# Patient Record
Sex: MALE | Race: WHITE | Employment: FULL TIME | URBAN - NONMETROPOLITAN AREA
[De-identification: names, ages, dates, MRNs, and addresses within clinical notes are randomized per-mention and may not be internally consistent; named-entity substitution may affect disease eponyms.]

---

## 2017-06-20 ENCOUNTER — TELEPHONE (OUTPATIENT)
Dept: FAMILY MEDICINE CLINIC | Age: 32
End: 2017-06-20

## 2017-09-08 ENCOUNTER — HOSPITAL ENCOUNTER (EMERGENCY)
Age: 32
Discharge: HOME | End: 2017-09-08
Attending: FAMILY MEDICINE
Payer: COMMERCIAL

## 2017-09-08 VITALS
SYSTOLIC BLOOD PRESSURE: 114 MMHG | RESPIRATION RATE: 14 BRPM | WEIGHT: 185 LBS | BODY MASS INDEX: 25.06 KG/M2 | HEART RATE: 87 BPM | DIASTOLIC BLOOD PRESSURE: 77 MMHG | TEMPERATURE: 97.6 F | OXYGEN SATURATION: 98 % | HEIGHT: 72 IN

## 2017-09-08 DIAGNOSIS — K29.00 ACUTE GASTRITIS WITHOUT HEMORRHAGE, UNSPECIFIED GASTRITIS TYPE: ICD-10-CM

## 2017-09-08 DIAGNOSIS — R10.32 LLQ ABDOMINAL PAIN: Primary | ICD-10-CM

## 2017-09-08 DIAGNOSIS — K62.5 RECTAL BLEEDING: ICD-10-CM

## 2017-09-08 LAB
ALBUMIN SERPL-MCNC: 4.4 G/DL (ref 3.5–5.1)
ALP BLD-CCNC: 45 U/L (ref 38–126)
ALT SERPL-CCNC: 15 U/L (ref 11–66)
ANION GAP SERPL CALCULATED.3IONS-SCNC: 12 MEQ/L (ref 8–16)
APTT: 31.8 SECONDS (ref 22–38)
AST SERPL-CCNC: 16 U/L (ref 5–40)
BASOPHILS # BLD: 0.4 %
BASOPHILS ABSOLUTE: 0 THOU/MM3 (ref 0–0.1)
BILIRUB SERPL-MCNC: 0.3 MG/DL (ref 0.3–1.2)
BILIRUBIN DIRECT: < 0.2 MG/DL (ref 0–0.3)
BUN BLDV-MCNC: 12 MG/DL (ref 7–22)
CALCIUM SERPL-MCNC: 9.2 MG/DL (ref 8.5–10.5)
CHLORIDE BLD-SCNC: 105 MEQ/L (ref 98–111)
CO2: 25 MEQ/L (ref 23–33)
CREAT SERPL-MCNC: 0.8 MG/DL (ref 0.4–1.2)
EOSINOPHIL # BLD: 6.4 %
EOSINOPHILS ABSOLUTE: 0.3 THOU/MM3 (ref 0–0.4)
GFR SERPL CREATININE-BSD FRML MDRD: > 90 ML/MIN/1.73M2
GLUCOSE BLD-MCNC: 85 MG/DL (ref 70–108)
HCT VFR BLD CALC: 42 % (ref 42–52)
HEMOCCULT STL QL: NEGATIVE
HEMOGLOBIN: 14.4 GM/DL (ref 14–18)
LIPASE: 26.5 U/L (ref 5.6–51.3)
LYMPHOCYTES # BLD: 34 %
LYMPHOCYTES ABSOLUTE: 1.7 THOU/MM3 (ref 1–4.8)
MCH RBC QN AUTO: 32.9 PG (ref 27–31)
MCHC RBC AUTO-ENTMCNC: 34.3 GM/DL (ref 33–37)
MCV RBC AUTO: 96 FL (ref 80–94)
MONOCYTES # BLD: 7.8 %
MONOCYTES ABSOLUTE: 0.4 THOU/MM3 (ref 0.4–1.3)
NUCLEATED RED BLOOD CELLS: 0 /100 WBC
OSMOLALITY CALCULATION: 282.1 MOSMOL/KG (ref 275–300)
PDW BLD-RTO: 13.9 % (ref 11.5–14.5)
PLATELET # BLD: 185 THOU/MM3 (ref 130–400)
PMV BLD AUTO: 8.3 MCM (ref 7.4–10.4)
POTASSIUM SERPL-SCNC: 4.4 MEQ/L (ref 3.5–5.2)
PRO-BNP: 25.9 PG/ML (ref 0–450)
RBC # BLD: 4.38 MILL/MM3 (ref 4.7–6.1)
RBC # BLD: NORMAL 10*6/UL
SEG NEUTROPHILS: 51.4 %
SEGMENTED NEUTROPHILS ABSOLUTE COUNT: 2.6 THOU/MM3 (ref 1.8–7.7)
SODIUM BLD-SCNC: 142 MEQ/L (ref 135–145)
TOTAL PROTEIN: 7 G/DL (ref 6.1–8)
WBC # BLD: 5.1 THOU/MM3 (ref 4.8–10.8)

## 2017-09-08 PROCEDURE — 82248 BILIRUBIN DIRECT: CPT

## 2017-09-08 PROCEDURE — 36415 COLL VENOUS BLD VENIPUNCTURE: CPT

## 2017-09-08 PROCEDURE — 2580000003 HC RX 258: Performed by: FAMILY MEDICINE

## 2017-09-08 PROCEDURE — 99214 OFFICE O/P EST MOD 30 MIN: CPT | Performed by: NURSE PRACTITIONER

## 2017-09-08 PROCEDURE — 85025 COMPLETE CBC W/AUTO DIFF WBC: CPT

## 2017-09-08 PROCEDURE — 99215 OFFICE O/P EST HI 40 MIN: CPT

## 2017-09-08 PROCEDURE — 83880 ASSAY OF NATRIURETIC PEPTIDE: CPT

## 2017-09-08 PROCEDURE — 80053 COMPREHEN METABOLIC PANEL: CPT

## 2017-09-08 PROCEDURE — 99285 EMERGENCY DEPT VISIT HI MDM: CPT

## 2017-09-08 PROCEDURE — 85730 THROMBOPLASTIN TIME PARTIAL: CPT

## 2017-09-08 PROCEDURE — 83690 ASSAY OF LIPASE: CPT

## 2017-09-08 PROCEDURE — 82272 OCCULT BLD FECES 1-3 TESTS: CPT

## 2017-09-08 RX ORDER — 0.9 % SODIUM CHLORIDE 0.9 %
1000 INTRAVENOUS SOLUTION INTRAVENOUS ONCE
Status: COMPLETED | OUTPATIENT
Start: 2017-09-08 | End: 2017-09-08

## 2017-09-08 RX ORDER — RANITIDINE 150 MG/1
150 TABLET ORAL 2 TIMES DAILY
Qty: 28 TABLET | Refills: 0 | Status: SHIPPED | OUTPATIENT
Start: 2017-09-08 | End: 2018-01-11 | Stop reason: ALTCHOICE

## 2017-09-08 RX ADMIN — SODIUM CHLORIDE 1000 ML: 9 INJECTION, SOLUTION INTRAVENOUS at 10:24

## 2017-09-08 ASSESSMENT — ENCOUNTER SYMPTOMS
NAUSEA: 1
NAUSEA: 0
ABDOMINAL DISTENTION: 0
VOMITING: 0
CHEST TIGHTNESS: 0
CONSTIPATION: 0
TROUBLE SWALLOWING: 0
SORE THROAT: 0
RECTAL PAIN: 0
COUGH: 0
BACK PAIN: 0
SHORTNESS OF BREATH: 0
COLOR CHANGE: 0
ANAL BLEEDING: 0
RHINORRHEA: 0
BLOOD IN STOOL: 1
DIARRHEA: 1
DIARRHEA: 0
WHEEZING: 0
ABDOMINAL PAIN: 1

## 2017-09-08 ASSESSMENT — PAIN DESCRIPTION - FREQUENCY
FREQUENCY: INTERMITTENT
FREQUENCY: INTERMITTENT

## 2017-09-08 ASSESSMENT — PAIN DESCRIPTION - ORIENTATION
ORIENTATION: LEFT;LOWER
ORIENTATION: LEFT
ORIENTATION: LEFT;LOWER

## 2017-09-08 ASSESSMENT — PAIN DESCRIPTION - DESCRIPTORS
DESCRIPTORS: ACHING
DESCRIPTORS: SHARP
DESCRIPTORS: ACHING

## 2017-09-08 ASSESSMENT — PAIN SCALES - GENERAL
PAINLEVEL_OUTOF10: 1
PAINLEVEL_OUTOF10: 4
PAINLEVEL_OUTOF10: 1

## 2017-09-08 ASSESSMENT — PAIN DESCRIPTION - LOCATION
LOCATION: ABDOMEN

## 2017-09-08 ASSESSMENT — PAIN DESCRIPTION - ONSET: ONSET: AWAKENED FROM SLEEP

## 2017-09-08 ASSESSMENT — PAIN DESCRIPTION - PAIN TYPE
TYPE: ACUTE PAIN

## 2017-12-05 ENCOUNTER — TELEPHONE (OUTPATIENT)
Dept: FAMILY MEDICINE CLINIC | Age: 32
End: 2017-12-05

## 2017-12-07 ENCOUNTER — OFFICE VISIT (OUTPATIENT)
Dept: FAMILY MEDICINE CLINIC | Age: 32
End: 2017-12-07
Payer: COMMERCIAL

## 2017-12-07 VITALS
TEMPERATURE: 98 F | WEIGHT: 188.4 LBS | RESPIRATION RATE: 12 BRPM | BODY MASS INDEX: 26.38 KG/M2 | DIASTOLIC BLOOD PRESSURE: 98 MMHG | HEART RATE: 72 BPM | SYSTOLIC BLOOD PRESSURE: 144 MMHG | HEIGHT: 71 IN

## 2017-12-07 DIAGNOSIS — F32.2 MAJOR DEPRESSIVE DISORDER, SINGLE EPISODE, SEVERE WITH ANXIOUS DISTRESS (HCC): ICD-10-CM

## 2017-12-07 DIAGNOSIS — Z13.220 SCREENING FOR HYPERLIPIDEMIA: Primary | ICD-10-CM

## 2017-12-07 DIAGNOSIS — R10.32 LEFT GROIN PAIN: ICD-10-CM

## 2017-12-07 PROCEDURE — 99213 OFFICE O/P EST LOW 20 MIN: CPT | Performed by: NURSE PRACTITIONER

## 2017-12-07 RX ORDER — HYDROXYZINE HYDROCHLORIDE 25 MG/1
TABLET, FILM COATED ORAL
Qty: 60 TABLET | Refills: 1 | Status: SHIPPED | OUTPATIENT
Start: 2017-12-07 | End: 2018-01-11 | Stop reason: SDUPTHER

## 2017-12-07 RX ORDER — CITALOPRAM 20 MG/1
20 TABLET ORAL DAILY
Qty: 30 TABLET | Refills: 3 | Status: SHIPPED | OUTPATIENT
Start: 2017-12-07 | End: 2018-01-11 | Stop reason: SDUPTHER

## 2017-12-07 RX ORDER — TRAZODONE HYDROCHLORIDE 50 MG/1
TABLET ORAL
Qty: 60 TABLET | Refills: 0 | Status: SHIPPED | OUTPATIENT
Start: 2017-12-07 | End: 2018-03-09 | Stop reason: SDUPTHER

## 2017-12-07 ASSESSMENT — PATIENT HEALTH QUESTIONNAIRE - PHQ9
2. FEELING DOWN, DEPRESSED OR HOPELESS: 2
7. TROUBLE CONCENTRATING ON THINGS, SUCH AS READING THE NEWSPAPER OR WATCHING TELEVISION: 2
3. TROUBLE FALLING OR STAYING ASLEEP: 3
5. POOR APPETITE OR OVEREATING: 2
1. LITTLE INTEREST OR PLEASURE IN DOING THINGS: 2
4. FEELING TIRED OR HAVING LITTLE ENERGY: 2
SUM OF ALL RESPONSES TO PHQ9 QUESTIONS 1 & 2: 4
10. IF YOU CHECKED OFF ANY PROBLEMS, HOW DIFFICULT HAVE THESE PROBLEMS MADE IT FOR YOU TO DO YOUR WORK, TAKE CARE OF THINGS AT HOME, OR GET ALONG WITH OTHER PEOPLE: 3
SUM OF ALL RESPONSES TO PHQ QUESTIONS 1-9: 16
9. THOUGHTS THAT YOU WOULD BE BETTER OFF DEAD, OR OF HURTING YOURSELF: 0
8. MOVING OR SPEAKING SO SLOWLY THAT OTHER PEOPLE COULD HAVE NOTICED. OR THE OPPOSITE, BEING SO FIGETY OR RESTLESS THAT YOU HAVE BEEN MOVING AROUND A LOT MORE THAN USUAL: 1
6. FEELING BAD ABOUT YOURSELF - OR THAT YOU ARE A FAILURE OR HAVE LET YOURSELF OR YOUR FAMILY DOWN: 2

## 2017-12-07 NOTE — PROGRESS NOTES
Chief Complaint   Patient presents with   Meg Jolly Doctor     previous pcp Dr Sadia Luu Depression     currently seeing Alber Ochoa in [place], [place]       History obtained from chart review and the patient. SUBJECTIVE:  Tammy Sanchez is a 34 y.o. male that presents today for establishing care with new physician, etc. New patient, 1st time visit to [facility] @ [address]. Depressed Mood    HPI:  Has been dealing with depression for about 3-4 years. Previously his depressive symptoms were manageable. He lost his dad 1 year ago and his depression has been terrible since then. Also having a lot of anxiety. Doesn't know triggers, can be public situations. But sometimes will wake up at night in panic attack. Will get a panic attack about every other day. Symptoms CP, lightheaded, SOB diaphoretic  Depressed Mood? yes - daily  Anhedonia? yes - doesn't care about anything, poor motivation  Appetite changes? yes - some days he can't eat, some days he feels like he could eat a horse  Sleep disturbances? yes - difficulty falling asleep and staying asleep  Feelings of guilt? yes - feels inadequate, doesn't know why he feels this way  Decreased energy? yes - poor, no drive to do anything  Impaired concentration? yes - feels like he just goes from one thing to another, can't ever get anything done  Substance abuse? yes - marijuana in the past, but has been clean for about > 6 months, used to drink a lot of ETOH, but is currently doing none. Clean since Sept 16 2017. Is also going to AA. Suicidal/Homicidal Ideation? no      Compliant with meds: n/a  Med side effects: n/a  Sees therapist?:  yes - Andrey Speaks  Family History of Mental Illness?   yes - dad    Review of Systems - Psychological ROS: positive for - anxiety, decreased libido, hostility, irritability, memory difficulties, mood swings and sleep disturbances, negative for - disorientation, hallucinations, physical abuse or sexual abuse    Also feels a popping sensation in his left groin when he goes to have a BM. Does not feel a bulge in his left groin, and it occurs pretty much every time he strains for BM. 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 Age/Gender Health Maintenance    Lipid - n/a  DM Screen - n/a  Colon Cancer Screening - n/a  Lung Cancer Screening (Age 54 to [de-identified] with 30 pack year hx, current smoker or quit within past 15 years) - n/a    Tetanus - needs  Influenza Vaccine - needs  Pneumonia Vaccine - needs  Zostavax - n/a     PSA testing discussion - n/a  AAA Screening - n/a    Falls screening - n/a    Current Outpatient Prescriptions   Medication Sig Dispense Refill    citalopram (CELEXA) 20 MG tablet Take 1 tablet by mouth daily 30 tablet 3    traZODone (DESYREL) 50 MG tablet Take 1-2 tablets by mouth nightly prn for sleep 60 tablet 0    hydrOXYzine (ATARAX) 25 MG tablet Take 1-2 tablets by mouth every 6-8 hours prn for anxiety. 60 tablet 1    ranitidine (ZANTAC) 150 MG tablet Take 1 tablet by mouth 2 times daily for 14 days 28 tablet 0     No current facility-administered medications for this visit. Orders Placed This Encounter   Medications    citalopram (CELEXA) 20 MG tablet     Sig: Take 1 tablet by mouth daily     Dispense:  30 tablet     Refill:  3    traZODone (DESYREL) 50 MG tablet     Sig: Take 1-2 tablets by mouth nightly prn for sleep     Dispense:  60 tablet     Refill:  0    hydrOXYzine (ATARAX) 25 MG tablet     Sig: Take 1-2 tablets by mouth every 6-8 hours prn for anxiety. Dispense:  60 tablet     Refill:  1         All medications reviewed and reconciled, including OTC and herbal medications. Updated list given to patient.        Patient Active Problem List   Diagnosis    Alcohol abuse    Drug abuse    Major depressive disorder, single episode, severe with anxious distress ([address]       Past Medical History:   Diagnosis Date    Depression     Major depressive disorder, single episode, severe with Good insight and appropriate interaction. Cognition and memory appear to be intact. Skin: warm and dry, no rash or erythema  Lymph:  No cervical, auricular or supraclavicular lymph nodes palpated        ASSESSMENT & PLAN  1. Major depressive disorder, single episode, severe with anxious distress ([facility]  - benefits, risks and SE discussed  - no benzo's due to Hx of ETOH and substance abuse  - Atarax for panic attacks, Trazodone for sleep  - recommend continue counseling  - citalopram (CELEXA) 20 MG tablet; Take 1 tablet by mouth daily  Dispense: 30 tablet; Refill: 3  - traZODone (DESYREL) 50 MG tablet; Take 1-2 tablets by mouth nightly prn for sleep  Dispense: 60 tablet; Refill: 0  - hydrOXYzine (ATARAX) 25 MG tablet; Take 1-2 tablets by mouth every 6-8 hours prn for anxiety. Dispense: 60 tablet; Refill: 1  - Comprehensive Metabolic Panel; Future  - CBC With Auto Differential; Future  - TSH With Reflex Ft4; Future  - Vitamin D 25 Hydroxy; Future    2. Screening for hyperlipidemia  - Lipid Panel; Future    3. Groin strain, left, initial encounter  - monitor for now, NSAIDS  - if no improvement in several weeks with next appt, will consider doing US left groin to rule out occult hernia        DISPOSITION    Return in about 4 weeks (around 1/4/2018) for anxiety and depression. Adrian Jack released without restrictions. PATIENT COUNSELING    Counseling was provided today regarding the following topics: Healthy eating habits, Regular exercise, substance abuse and healthy sleep habits. Adrian Jack received counseling on the following healthy behaviors: nutrition, exercise and medication adherence    Patient given educational materials on: See Attached    I have instructed Adrian Jack to complete a self tracking handout on none and instructed them to bring it with them to his next appointment. Barriers to learning and self management: none    Discussed use, benefit, and side effects of prescribed medications.   Barriers to medication compliance addressed. All patient questions answered. Pt voiced understanding.        Electronically signed by Darryle Rondo, CNP on 12/7/2017 at 10:13 AM

## 2018-01-11 ENCOUNTER — OFFICE VISIT (OUTPATIENT)
Dept: FAMILY MEDICINE CLINIC | Age: 33
End: 2018-01-11
Payer: COMMERCIAL

## 2018-01-11 VITALS
HEIGHT: 71 IN | TEMPERATURE: 97.9 F | WEIGHT: 184.8 LBS | HEART RATE: 71 BPM | BODY MASS INDEX: 25.87 KG/M2 | DIASTOLIC BLOOD PRESSURE: 108 MMHG | RESPIRATION RATE: 14 BRPM | SYSTOLIC BLOOD PRESSURE: 146 MMHG

## 2018-01-11 DIAGNOSIS — R03.0 ELEVATED BP WITHOUT DIAGNOSIS OF HYPERTENSION: ICD-10-CM

## 2018-01-11 DIAGNOSIS — F41.0 PANIC ATTACKS: Primary | ICD-10-CM

## 2018-01-11 DIAGNOSIS — F32.2 MAJOR DEPRESSIVE DISORDER, SINGLE EPISODE, SEVERE WITH ANXIOUS DISTRESS (HCC): ICD-10-CM

## 2018-01-11 LAB
ABSOLUTE BASO #: 0 K/UL (ref 0–0.1)
ABSOLUTE EOS #: 0.2 K/UL (ref 0.1–0.4)
ABSOLUTE LYMPH #: 1.3 K/UL (ref 0.8–5.2)
ABSOLUTE MONO #: 0.5 K/UL (ref 0.1–0.9)
ABSOLUTE NEUT #: 3.3 K/UL (ref 1.3–9.1)
ALBUMIN SERPL-MCNC: 4.5 G/DL (ref 3.2–5.3)
ALK PHOSPHATASE: 55 IU/L (ref 35–121)
ALT SERPL-CCNC: 14 IU/L (ref 5–59)
ANION GAP SERPL CALCULATED.3IONS-SCNC: 15 MMOL/L
AST SERPL-CCNC: 19 IU/L (ref 10–42)
BASOPHILS RELATIVE PERCENT: 0.7 %
BILIRUB SERPL-MCNC: 1 MG/DL (ref 0.2–1.3)
BUN BLDV-MCNC: 9 MG/DL (ref 10–20)
CALCIUM SERPL-MCNC: 10 MG/DL (ref 8.7–10.8)
CHLORIDE BLD-SCNC: 103 MMOL/L (ref 95–111)
CHOLESTEROL/HDL RATIO: 2
CHOLESTEROL: 164 MG/DL
CO2: 27 MMOL/L (ref 21–32)
CREAT SERPL-MCNC: 0.9 MG/DL (ref 0.5–1.3)
EGFR AFRICAN AMERICAN: 118
EGFR IF NONAFRICAN AMERICAN: 97
EOSINOPHILS RELATIVE PERCENT: 3.6 %
GLUCOSE: 82 MG/DL (ref 70–100)
HCT VFR BLD CALC: 47 % (ref 41.4–51)
HDLC SERPL-MCNC: 83 MG/DL (ref 40–60)
HEMOGLOBIN: 16.4 G/DL (ref 13.8–17)
LDL CHOLESTEROL CALCULATED: 65 MG/DL
LDL/HDL RATIO: 0.8
LYMPHOCYTE %: 23.8 %
MCH RBC QN AUTO: 33.3 PG (ref 27–34)
MCHC RBC AUTO-ENTMCNC: 34.9 G/DL (ref 31–36)
MCV RBC AUTO: 95.5 FL (ref 80–100)
MONOCYTES # BLD: 9.6 %
NEUTROPHILS RELATIVE PERCENT: 62.1 %
PDW BLD-RTO: 13 % (ref 10.8–14.8)
PLATELETS: 217 K/UL (ref 150–450)
POTASSIUM SERPL-SCNC: 4.5 MMOL/L (ref 3.5–5.4)
RBC: 4.92 M/UL (ref 4–5.5)
SODIUM BLD-SCNC: 140 MMOL/L (ref 134–147)
TOTAL PROTEIN: 7.5 G/DL (ref 5.8–8)
TRIGL SERPL-MCNC: 80 MG/DL
TSH SERPL DL<=0.05 MIU/L-ACNC: 0.89 UIU/ML (ref 0.4–4.4)
VLDLC SERPL CALC-MCNC: 16 MG/DL
WBC: 5.3 K/UL (ref 3.7–10.8)

## 2018-01-11 PROCEDURE — 99213 OFFICE O/P EST LOW 20 MIN: CPT | Performed by: NURSE PRACTITIONER

## 2018-01-11 RX ORDER — HYDROXYZINE 50 MG/1
TABLET, FILM COATED ORAL
Qty: 60 TABLET | Refills: 1 | Status: SHIPPED | OUTPATIENT
Start: 2018-01-11 | End: 2018-08-09

## 2018-01-11 RX ORDER — CITALOPRAM 40 MG/1
40 TABLET ORAL DAILY
Qty: 30 TABLET | Refills: 5 | Status: SHIPPED | OUTPATIENT
Start: 2018-01-11 | End: 2018-03-09 | Stop reason: ALTCHOICE

## 2018-01-12 ENCOUNTER — TELEPHONE (OUTPATIENT)
Dept: FAMILY MEDICINE CLINIC | Age: 33
End: 2018-01-12

## 2018-01-12 LAB — VITAMIN D 25-HYDROXY: 28 NG/ML

## 2018-01-12 NOTE — TELEPHONE ENCOUNTER
----- Message from Dyana Busby CNP sent at 1/12/2018  8:31 AM EST -----  Let Danisha Mary Jos know his labs look great. Normal kidney, liver function, thyroid function and blood count. His cholesterol is good total cholesterol 164, triglycerides 80, HDL 83, LDL 65. Cholesterol is good. His vitamin D is a little low at 28, i'd recommend  He take an OTC vitamin D supplement daily (400 units of vitamin D3 is fine) or a multi vitamin should be fine. Most multi vitamins have vitamin D in them and it should be enough to get it up. Any questions let me know!

## 2018-03-09 ENCOUNTER — TELEPHONE (OUTPATIENT)
Dept: FAMILY MEDICINE CLINIC | Age: 33
End: 2018-03-09

## 2018-03-09 DIAGNOSIS — F32.2 MAJOR DEPRESSIVE DISORDER, SINGLE EPISODE, SEVERE WITH ANXIOUS DISTRESS (HCC): Primary | ICD-10-CM

## 2018-03-09 DIAGNOSIS — F32.2 MAJOR DEPRESSIVE DISORDER, SINGLE EPISODE, SEVERE WITH ANXIOUS DISTRESS (HCC): ICD-10-CM

## 2018-03-09 RX ORDER — TRAZODONE HYDROCHLORIDE 50 MG/1
TABLET ORAL
Qty: 60 TABLET | Refills: 0 | Status: SHIPPED | OUTPATIENT
Start: 2018-03-09 | End: 2018-08-09

## 2018-03-09 RX ORDER — SERTRALINE HYDROCHLORIDE 100 MG/1
100 TABLET, FILM COATED ORAL DAILY
Qty: 30 TABLET | Refills: 3 | Status: SHIPPED | OUTPATIENT
Start: 2018-03-09 | End: 2018-03-28 | Stop reason: SDUPTHER

## 2018-03-09 NOTE — TELEPHONE ENCOUNTER
Ok, stop the Celexa then, I will send in Rx for Zoloft 100 mg. He needs to follow up with me in the office.

## 2018-03-28 ENCOUNTER — OFFICE VISIT (OUTPATIENT)
Dept: FAMILY MEDICINE CLINIC | Age: 33
End: 2018-03-28
Payer: COMMERCIAL

## 2018-03-28 VITALS
TEMPERATURE: 97.8 F | BODY MASS INDEX: 25.17 KG/M2 | DIASTOLIC BLOOD PRESSURE: 84 MMHG | WEIGHT: 185.8 LBS | RESPIRATION RATE: 14 BRPM | SYSTOLIC BLOOD PRESSURE: 132 MMHG | HEIGHT: 72 IN | HEART RATE: 71 BPM

## 2018-03-28 DIAGNOSIS — F32.2 MAJOR DEPRESSIVE DISORDER, SINGLE EPISODE, SEVERE WITH ANXIOUS DISTRESS (HCC): ICD-10-CM

## 2018-03-28 DIAGNOSIS — Z13.31 POSITIVE DEPRESSION SCREENING: Primary | ICD-10-CM

## 2018-03-28 PROCEDURE — 99213 OFFICE O/P EST LOW 20 MIN: CPT | Performed by: NURSE PRACTITIONER

## 2018-03-28 PROCEDURE — G8431 POS CLIN DEPRES SCRN F/U DOC: HCPCS | Performed by: NURSE PRACTITIONER

## 2018-03-28 RX ORDER — SERTRALINE HYDROCHLORIDE 100 MG/1
150 TABLET, FILM COATED ORAL DAILY
Qty: 135 TABLET | Refills: 3 | Status: SHIPPED | OUTPATIENT
Start: 2018-03-28 | End: 2018-08-09

## 2018-03-28 NOTE — PROGRESS NOTES
weight  Eyes:  Eye discharge, Eye pain, Eye redness, Visual disturbances   HENT:  Ear pain, Tinnitus, Nosebleeds, Trouble swallowing, Hearing loss, Sore throat  Cardiovascular:  Chest Pain, Palpitations, Orthopnea, Paroxysmal Nocturnal Dyspnea  Respiratory:  Cough, Wheezing, Shortness of breath, Chest tightness, Apnea  Gastrointestinal:  Nausea, Vomiting, Diarrhea, Constipation, Heartburn, Blood in stool  Genitourinary:  Difficulty or painful urination, Flank pain, Change in frequency, Urgency  Skin:  Color change, Rash, Itching, Wound  Psychiatric:  Hallucinations, Anxiety, Depression, Suicidal ideation  Hematological:  Enlarged glands, Easy bleeding, Easily bruising  Musculoskeletal:  Joint pain, Back pain, Gait problems, Joint swelling, Myalgias  Neurological:  Dizziness, Headaches, Presyncope, Numbness, Seizures, Tremors  Allergy:  Environmental allergies, Food allergies  Endocrine:  Heat Intolerance, Cold Intolerance, Polydipsia, Polyphagia, Polyuria      PHYSICAL EXAM:  Vitals:    03/28/18 1130   BP: 132/84   Pulse: 71   Resp: 14   Temp: 97.8 °F (36.6 °C)   TempSrc: Oral   Weight: 185 lb 12.8 oz (84.3 kg)   Height: 6' (1.829 m)     Body mass index is 25.2 kg/m². VS Reviewed  General Appearance: A&O x 3, No acute distress,well developed and well- nourished  Head: normocephalic and atraumatic  Eyes: pupils equal, round, and reactive to light, extraocular eye movements intact, conjunctivae and eye lids without erythema  Neck: supple and non-tender without mass, no thyromegaly or thyroid nodules, no cervical lymphadenopathy  Pulmonary/Chest: clear to auscultation bilaterally- no wheezes, rales or rhonchi, normal air movement, no respiratory distress or retractions  Cardiovascular: S1 and S2 auscultated w/ RRR. No murmurs, rubs, clicks, or gallops, distal pulses intact. Abdomen: soft, non-tender, non-distended, bowl sounds physiologic,  no rebound or guarding, no masses or hernias noted.  Liver and spleen without enlargement  Extremities: no cyanosis, clubbing or edema of the lower extremities  Musculoskeletal: No joint swelling or gross deformity   Neuro:  Alert, 5/5 strength globally and symmetrically  Psych: Affect flat. Mood normal. Thought process is normal without evidence of psychosis. Good insight and appropriate interaction. Cognition and memory appear to be intact. Skin: warm and dry, no rash or erythema  Lymph:  No cervical, auricular or supraclavicular lymph nodes palpated      ASSESSMENT & PLAN  Deborah Proctor was seen today for follow-up. Diagnoses and all orders for this visit:    Positive depression screening  -     Positive Screen for Clinical Depression with a Documented Follow-up Plan   -     sertraline (ZOLOFT) 100 MG tablet; Take 1.5 tablets by mouth daily    Major depressive disorder, single episode, severe with anxious distress (HCC)  -     sertraline (ZOLOFT) 100 MG tablet; Take 1.5 tablets by mouth daily    increase Zoloft more for anxiety  Cannot do Benzos for panic attacks due to substance abuse Hx  Continue Atarax prn  May consider adding Buspar  Depression in partial remission    DISPOSITION    Return in about 2 months (around 5/28/2018) for anxiety and depression. Deborah Proctor released without restrictions. PATIENT COUNSELING    Counseling was provided today regarding the following topics: Healthy eating habits, Regular exercise, substance abuse and healthy sleep habits. Deborah Proctor received counseling on the following healthy behaviors: nutrition, exercise and medication adherence    Patient given educational materials on: See Attached    I have instructed Deborah Proctor to complete a self tracking handout on none and instructed them to bring it with them to his next appointment. Barriers to learning and self management: none    Discussed use, benefit, and side effects of prescribed medications. Barriers to medication compliance addressed. All patient questions answered.   Pt voiced understanding. Electronically signed by Yamini Herzog CNP on 3/28/2018 at 11:44 AM    On the basis of positive PHQ-9 screening ( ), the following plan was implemented: medication prescribed: Zoloft- 150 mg- patient will call for any significant medication side effects or worsening symptoms of depression. Patient will follow-up in 2 month(s) with PCP.

## 2018-04-04 ENCOUNTER — TELEPHONE (OUTPATIENT)
Dept: FAMILY MEDICINE CLINIC | Age: 33
End: 2018-04-04

## 2018-08-09 ENCOUNTER — HOSPITAL ENCOUNTER (EMERGENCY)
Age: 33
Discharge: HOME | End: 2018-08-09
Attending: EMERGENCY MEDICINE
Payer: COMMERCIAL

## 2018-08-09 VITALS
SYSTOLIC BLOOD PRESSURE: 101 MMHG | BODY MASS INDEX: 25.73 KG/M2 | HEART RATE: 70 BPM | DIASTOLIC BLOOD PRESSURE: 66 MMHG | WEIGHT: 190 LBS | RESPIRATION RATE: 20 BRPM | OXYGEN SATURATION: 97 % | TEMPERATURE: 97.6 F | HEIGHT: 72 IN

## 2018-08-09 DIAGNOSIS — R30.0 DYSURIA: Primary | ICD-10-CM

## 2018-08-09 LAB
ANION GAP SERPL CALCULATED.3IONS-SCNC: 13 MEQ/L (ref 8–16)
BASOPHILS # BLD: 0.6 %
BASOPHILS ABSOLUTE: 0 THOU/MM3 (ref 0–0.1)
BILIRUBIN URINE: NEGATIVE
BLOOD, URINE: NEGATIVE
BUN BLDV-MCNC: 8 MG/DL (ref 7–22)
CALCIUM SERPL-MCNC: 9.3 MG/DL (ref 8.5–10.5)
CHARACTER, URINE: CLEAR
CHLAMYDIA TRACHOMATIS BY RT-PCR: NOT DETECTED
CHLORIDE BLD-SCNC: 106 MEQ/L (ref 98–111)
CO2: 27 MEQ/L (ref 23–33)
COLOR: YELLOW
CREAT SERPL-MCNC: 1 MG/DL (ref 0.4–1.2)
CT/NG SOURCE: NORMAL
EOSINOPHIL # BLD: 2.7 %
EOSINOPHILS ABSOLUTE: 0.1 THOU/MM3 (ref 0–0.4)
ERYTHROCYTE [DISTWIDTH] IN BLOOD BY AUTOMATED COUNT: 12.3 % (ref 11.5–14.5)
ERYTHROCYTE [DISTWIDTH] IN BLOOD BY AUTOMATED COUNT: 42.7 FL (ref 35–45)
GFR SERPL CREATININE-BSD FRML MDRD: 86 ML/MIN/1.73M2
GLUCOSE BLD-MCNC: 93 MG/DL (ref 70–108)
GLUCOSE URINE: NEGATIVE MG/DL
HCT VFR BLD CALC: 42.8 % (ref 42–52)
HEMOGLOBIN: 14.8 GM/DL (ref 14–18)
IMMATURE GRANS (ABS): 0.01 THOU/MM3 (ref 0–0.07)
IMMATURE GRANULOCYTES: 0.2 %
KETONES, URINE: NEGATIVE
LEUKOCYTE ESTERASE, URINE: NEGATIVE
LYMPHOCYTES # BLD: 38.6 %
LYMPHOCYTES ABSOLUTE: 2 THOU/MM3 (ref 1–4.8)
MCH RBC QN AUTO: 32.7 PG (ref 26–33)
MCHC RBC AUTO-ENTMCNC: 34.6 GM/DL (ref 32.2–35.5)
MCV RBC AUTO: 94.7 FL (ref 80–94)
MONOCYTES # BLD: 10 %
MONOCYTES ABSOLUTE: 0.5 THOU/MM3 (ref 0.4–1.3)
NEISSERIA GONORRHOEAE BY RT-PCR: NOT DETECTED
NITRITE, URINE: NEGATIVE
NUCLEATED RED BLOOD CELLS: 0 /100 WBC
OSMOLALITY CALCULATION: 288.6 MOSMOL/KG (ref 275–300)
PH UA: 6
PLATELET # BLD: 195 THOU/MM3 (ref 130–400)
PMV BLD AUTO: 10.6 FL (ref 9.4–12.4)
POTASSIUM SERPL-SCNC: 4.3 MEQ/L (ref 3.5–5.2)
PROTEIN UA: NEGATIVE
RBC # BLD: 4.52 MILL/MM3 (ref 4.7–6.1)
SEG NEUTROPHILS: 47.9 %
SEGMENTED NEUTROPHILS ABSOLUTE COUNT: 2.5 THOU/MM3 (ref 1.8–7.7)
SODIUM BLD-SCNC: 146 MEQ/L (ref 135–145)
SPECIFIC GRAVITY, URINE: 1.02 (ref 1–1.03)
UROBILINOGEN, URINE: 0.2 EU/DL
WBC # BLD: 5.2 THOU/MM3 (ref 4.8–10.8)

## 2018-08-09 PROCEDURE — 87591 N.GONORRHOEAE DNA AMP PROB: CPT

## 2018-08-09 PROCEDURE — 81003 URINALYSIS AUTO W/O SCOPE: CPT

## 2018-08-09 PROCEDURE — 99283 EMERGENCY DEPT VISIT LOW MDM: CPT

## 2018-08-09 PROCEDURE — 87491 CHLMYD TRACH DNA AMP PROBE: CPT

## 2018-08-09 PROCEDURE — 80048 BASIC METABOLIC PNL TOTAL CA: CPT

## 2018-08-09 PROCEDURE — 36415 COLL VENOUS BLD VENIPUNCTURE: CPT

## 2018-08-09 PROCEDURE — 85025 COMPLETE CBC W/AUTO DIFF WBC: CPT

## 2018-08-09 ASSESSMENT — PAIN DESCRIPTION - PAIN TYPE
TYPE: ACUTE PAIN
TYPE: ACUTE PAIN

## 2018-08-09 ASSESSMENT — PAIN DESCRIPTION - FREQUENCY
FREQUENCY: CONTINUOUS
FREQUENCY: CONTINUOUS

## 2018-08-09 ASSESSMENT — PAIN DESCRIPTION - LOCATION
LOCATION: FLANK
LOCATION: FLANK

## 2018-08-09 ASSESSMENT — PAIN DESCRIPTION - DESCRIPTORS
DESCRIPTORS: ACHING
DESCRIPTORS: ACHING

## 2018-08-09 ASSESSMENT — PAIN SCALES - GENERAL
PAINLEVEL_OUTOF10: 4
PAINLEVEL_OUTOF10: 4

## 2018-08-09 ASSESSMENT — PAIN DESCRIPTION - ORIENTATION: ORIENTATION: RIGHT;LEFT

## 2018-08-09 NOTE — LETTER
[facility] EMERGENCY DEPT  [address]  [address] [facility] [address]  Phone: [phone]               August 9, 2018    Patient: Priyanka Mcdaniels   YOB: 1985   Date of Visit: 8/9/2018       To Whom It May Concern:    Eyad Nassar was seen and treated in our emergency department on 8/9/2018. He may return to work on 08/10/2018.       Sincerely,               Signature:__________________________________

## 2018-08-09 NOTE — ED PROVIDER NOTES
(36.4 °C). His blood pressure is 101/66 and his pulse is 70. His respiration is 20 and oxygen saturation is 97%. Constitutional: Well appearing and non-toxic   HENT:  Atraumatic appearing  Neck- ,supple   Respiratory:  No wheezing, rhonchi or rales  Cardiovascular: regular, no murmur  GI:  Non tender, no rigidity, rebound or guarding  No suprapubic tenderness  :  No costovertebral angle tenderness   Musculoskeletal:  2/4 distal pulses, no pitting edema  Integument: warm and dry    DIAGNOSTIC RESULTS               LABS:   Labs Reviewed   CBC WITH AUTO DIFFERENTIAL - Abnormal; Notable for the following:        Result Value    RBC 4.52 (*)     MCV 94.7 (*)     All other components within normal limits   BASIC METABOLIC PANEL - Abnormal; Notable for the following:     Sodium 146 (*)     All other components within normal limits   GLOMERULAR FILTRATION RATE, ESTIMATED - Abnormal; Notable for the following:     Est, Glom Filt Rate 86 (*)     All other components within normal limits   C. TRACHOMATIS / N. GONORRHOEAE, DNA   URINE RT REFLEX TO CULTURE   ANION GAP   OSMOLALITY   URINALYSIS WITH MICROSCOPIC       EMERGENCY DEPARTMENT COURSE:   Vitals:    Vitals:    08/09/18 0709 08/09/18 0857   BP: 134/81 101/66   Pulse: 82 70   Resp: 18 20   Temp: 97.6 °F (36.4 °C)    TempSrc: Oral    SpO2: 99% 97%   Weight: 190 lb (86.2 kg)    Height: 6' (1.829 m)      This patient complained of urinary symptoms of painful urination and hesitance and he was able to provide a urine specimen. He is negative for UTI. There is no blood in his urine. We also put out a test for the [address] and chlamydia but by history it sounds like he is not necessarily been any recent exposure. He has no evidence of diabetes and has good kidney function. This could potentially be a prostate issue. In any case we will refer him to urology on an outpatient basis.     CRITICAL CARE:   none    CONSULTS:  none    PROCEDURES:  None    FINAL IMPRESSION      1.

## 2019-04-16 NOTE — PATIENT INSTRUCTIONS
_________   BP__________                   Date__________    BP __________    Date _________   BP__________                   Date__________    BP __________    Date _________   BP__________                   Date__________    BP __________    Date _________   BP__________                   Date__________    BP __________    Date _________   BP__________                   Date__________    BP __________    Date _________   BP__________                   Date__________    BP __________    Date _________   BP__________                   Date__________    BP __________    Date _________   BP__________                   Date__________    BP __________    Date _________   BP__________                   Date__________    BP __________ home

## 2020-01-17 ENCOUNTER — INITIAL CONSULT (OUTPATIENT)
Dept: PULMONOLOGY | Age: 35
End: 2020-01-17
Payer: COMMERCIAL

## 2020-01-17 VITALS
WEIGHT: 186 LBS | HEART RATE: 68 BPM | HEIGHT: 72 IN | BODY MASS INDEX: 25.19 KG/M2 | DIASTOLIC BLOOD PRESSURE: 76 MMHG | OXYGEN SATURATION: 98 % | SYSTOLIC BLOOD PRESSURE: 124 MMHG

## 2020-01-17 PROCEDURE — 99204 OFFICE O/P NEW MOD 45 MIN: CPT | Performed by: INTERNAL MEDICINE

## 2020-01-17 RX ORDER — FLUTICASONE PROPIONATE 50 MCG
1 SPRAY, SUSPENSION (ML) NASAL DAILY
COMMUNITY
End: 2021-01-17

## 2020-01-17 RX ORDER — LORATADINE 10 MG/1
10 CAPSULE, LIQUID FILLED ORAL DAILY
COMMUNITY
End: 2022-02-02

## 2020-01-17 NOTE — PATIENT INSTRUCTIONS
Recommendations/Plan:  -He was advised to stop taking Claritin to improve his excessive daytime sleepiness.  -Schedule patient for nocturnal polysomnogram (Sleep study) followed by Multiple sleep latency test at [facility] sleep lab to further evaluate for Narcolepsy.  -I had a discussion with patient regarding avialable treatment options for his sleep disorder breathing including but not limited to CPAP titration in the sleep lab Vs.Dental appliance placement with referral to a local dentist Vs other available surgical options including Uvulopalatopharyngoplasty, maxillomandibular ostomy and tracheostomy as last option. At the end of discussion, he is not decided on his   treatment if he found to have obstructive sleep apnea at this time.  -We will see Molly Ivory back in 1week after the sleep study to go over the sleep study results and further management options.  -He was educated to practice good sleep hygiene practices. He  was provided with a good sleep hygiene hand out.   -Patient educated about shift work sleep disorder and mechanism. He   was advised to maintain fixed sleep schedule.   -Tara Jiménez was advised to make earlier appointment with my clinic if he develops any worsening of sleep symptoms. He verbalizes understanding.  -Tara Jiménez was advised to not to drive any motor vehicles or operate heavy equipment until his sleep symptoms are under good control. Molly Ivory verbalizes understanding.  - Molly Ivory was educated about my impression and plan. He verbalizes understanding.

## 2020-01-17 NOTE — PROGRESS NOTES
Chief Complaint:  Mendez Colvin is here today for a consult for excessive sleepiness. Work recommended patient come. No previous testing.   Mallampati airway Class:III  Neck Circumference:16 Inches    [place] sleepiness score 1/17/20:

## 2020-01-17 NOTE — PROGRESS NOTES
[facility]    Nisha Redd                                             Chief Complaint:  Marcell Vides is here today for a consult for excessive sleepiness. Work recommended patient come. No previous testing. Chief complaint: Nisha Redd is a 27 y. o.oldmale came for further evaluation regarding his ?sleep apnea  with referral from [facility]  by Dr. Chayo Lora from his work place [employer] [place]:    Sleep/Wake schedule:  Usual time to go to bed during the work/regular day of week: 1:00 AM.  Usual time to wake up during the work//regular day of week: 1:00 PM.    He is currently working in second shift. He goes to work at 2:30pm and completes his work at 10:30 Pm. He works 5days a week in above shift. On the weekends his sleep schedule is different from above: On weekends he goes to bed to 11:00 pm and wakes up at 9:00 to 10:00Am the next day. Over the weekends his sleep schedule:.  []phase advanced. He usually falls a sleep in less than: 10 minutes. He takes naps: Yes. Number of naps per week:  3 to 4 times. During each nap he spends a total of: 1 to 2hour  The naps were reported as refreshing: Yes. Sleep Hygiene:    Is the temperature and evironment in his bed room is acceptable to him: Yes. He watches Television in his bed room: No.  He read books, study, pay bills etc in the bed: No.  Frequency He wake up during night/sleep: 1 time  Majority of nocturnal awakenings are for urination: No  Difficulty in falling back to sleep after nocturnal awakenings: No  .  Do you drink coffee: No.  Do you drink caffeinated beverages i.e sodas: Yes. 3 to 4 can/s per week. [place] dew      Do you drink tea:Yes. 1 to 2 bottles/glasse/s per day. Do you drink alcoholic beverages: Yes. 2 drink/s per week.        History of recreational drug use: No.     History of tobacco smoking:No.      Sleep apnea symptoms:  He is currently sleeping alone along with his dog. Noticed to have loud snoring: Yes- some times. Witnessed apneas during sleep noticed: No.   History of choking and gasping sensation at night time: No.  History of headaches in the morning:No.  History of dry mouth in the morning: Yes. History of palpitations during night time/nocturnal awakenings: No.  History of sweating during night time/nocturnal awakenings: Yes    General:  History of head injury in the past: No.    Associated loss of consciousness with head injury: No.  History of seizures: No.   Rest less legs syndrome symptoms:NO  History suggestive of periodic limb movements during sleep: NO  History suggestive of hypnagogic hallucinations: NO  History suggestive of hypnopompic hallucinations: NO  History suggestive of sleep talking:Yes. History suggestive of sleep walking:NO  History suggestive of bruxism: No.    History suggestive of cataplexy: NO  History suggestive of sleep paralysis: NO    Family history of sleep disorders:  Family history of obstructive sleep apnea: Yes. Family member diagnosed with obstructive sleep apnea mother. Family member using PAP therapy:  Yes. Family history of Narcolepsy: No.   Family history of Rest less legs syndrome : No.     History regarding old sleep studies:  Prior history of sleep study: No.  Using CPAP device: No.  Currently using home Oxygen: NO.      Patient considerations:  Is the patient is ambulatory: Yes  Patient is currently using: None of these Wheelchair, Yolande Lutz or [employer].   Para/Quadriplegic: NO  Hearing deficit : NO  Claustrophobic: NO  MDD : NO  Blind: NO  Incontinent: NO  Para/Quadraplegi: NO.   Need transportation to and from Sleep Center:NO      Social History:  Social History     Tobacco Use    Smoking status: Former Smoker     Packs/day: 0.25     Types: Cigarettes     Last attempt to quit: 2017     Years since quittin.7    Smokeless Pulse 68   Ht 6' (1.829 m)   Wt 186 lb (84.4 kg)   SpO2 98% Comment: on room air at rest  BMI 25.23 kg/m²   Mallampati airway Class:III  Neck Circumference:16 Inches  [place] sleepiness score 1/17/20: 21  Sleep apnea Quality of Life Questionnaire Score: 35    Physical Exam   Nursing note and vitals reviewed. Constitutional: Patient appears moderately built and moderately nourished. No distress. Patient is oriented to person, place, and time. HENT:   Head: Normocephalic and atraumatic. Right Ear: External ear normal.   Left Ear: External ear normal.   Mouth/Throat: Oropharynx is clear and moist.  No oral thrush. Eyes: Conjunctivae are normal. Pupils are equal, round, and reactive to light. No scleral icterus. Neck: Neck supple. No JVD present. No tracheal deviation present. Cardiovascular: Normal rate, regular rhythm, normal heart sounds. No murmur heard. Pulmonary/Chest: Effort normal and breath sounds normal. No stridor. No respiratory distress. No wheezes. No rales. Patient exhibits no tenderness. Abdominal: Soft. Patient exhibits no distension. No tenderness. Musculoskeletal: Normal range of motion. Extremities: Patient exhibits no edema and no tenderness. Old surgical scars from metallic deng placement noted on his right leg. Lymphadenopathy:  No cervical adenopathy. Neurological: Patient is alert and oriented to person, place, and time. Skin: Skin is warm and dry. Patient is not diaphoretic. Psychiatric: Patient  has a normal mood and affect. Patient behavior is normal.     Diagnostic Data:  None related sleep. Assessment:  -Snoring without witnessed apneas, nocturnal awakenings and excessive daytime sleepiness to evaluate for obstructive sleep apnea.   -Inadequate sleep hygiene.  -Hypersomnia ( Excessive daytime sleepiness) may be due to obstructive sleep apnea Vs Inadequate sleep hygiene Vs Narcolepsy Vs shift work sleep disorder.  -Allergic rhinitis on treatment with Flonase and Claritin. Recommendations/Plan:  -He was advised to stop taking Claritin to improve his excessive daytime sleepiness.  -Schedule patient for nocturnal polysomnogram (Sleep study) followed by Multiple sleep latency test at [facility] sleep lab to further evaluate for Narcolepsy.  -I had a discussion with patient regarding avialable treatment options for his sleep disorder breathing including but not limited to CPAP titration in the sleep lab Vs.Dental appliance placement with referral to a local dentist Vs other available surgical options including Uvulopalatopharyngoplasty, maxillomandibular ostomy and tracheostomy as last option. At the end of discussion, he is not decided on his   treatment if he found to have obstructive sleep apnea at this time.  -We will see José Miguel Vizcaino back in 1week after the sleep study to go over the sleep study results and further management options.  -He was educated to practice good sleep hygiene practices. He  was provided with a good sleep hygiene hand out.   -Patient educated about shift work sleep disorder and mechanism. He   was advised to maintain fixed sleep schedule.   -Ulices Singh was advised to make earlier appointment with my clinic if he develops any worsening of sleep symptoms. He verbalizes understanding.  -Danyeldebra Singh was advised to not to drive any motor vehicles or operate heavy equipment until his sleep symptoms are under good control. José Miguel Vizcaino verbalizes understanding.  - José Miguel Vizcaino was educated about my impression and plan. He verbalizes understanding.

## 2020-02-05 ENCOUNTER — HOSPITAL ENCOUNTER (OUTPATIENT)
Dept: SLEEP CENTER | Age: 35
Discharge: HOME | End: 2020-02-07
Payer: COMMERCIAL

## 2020-02-05 PROCEDURE — 95810 POLYSOM 6/> YRS 4/> PARAM: CPT

## 2020-02-06 ENCOUNTER — HOSPITAL ENCOUNTER (OUTPATIENT)
Dept: SLEEP CENTER | Age: 35
Discharge: HOME | End: 2020-02-06
Payer: COMMERCIAL

## 2020-02-06 LAB — STATUS: NORMAL

## 2020-02-07 NOTE — PROGRESS NOTES
[address] [place] [address]                               SLEEP STUDY REPORT    PATIENT NAME: Colette Sullivan                    :        1985  MED REC NO:   [id number]                           ROOM:  ACCOUNT NO:   [de-identified]                           ADMIT DATE: 2020  PROVIDER:     Jeovany Perry MD    DATE OF STUDY:  2020    SLEEP STUDY REPORT    REFERRING PROVIDER:  Dr. Minnie Dunne, from the patient's workplace. The patient's height is 72 inches, weight is 186.0 pounds. The  patient's BMI is 25.2. HISTORY:  The patient is a 65-year-old gentleman who was initially  evaluated by me on 2020. The patient is currently suffering with  hypersomnia with [place] Sleepiness Score of 21. The patient had class  III Mallampati airway. Due to his history of snoring and nocturnal  awakenings, the patient was scheduled for sleep study to evaluate for  sleep apnea as an etiology for hypersomnia. The patient had associated  comorbidities including allergic rhinitis and hypersomnia. METHODS:  The patient underwent digital polysomnography in compliance  with the standards and specifications from the AASM Manual including the  simultaneous recording of 3 EEG channels (F4-M1, C4-M1, and O2-M1 with  back up electrodes F3-M2, C3-M2, and O1-M2), 2 EOG channels (E1-M2, and  E2-M1,), EMG (chin, left & right leg), EKG, Nonin pulse oximetry with   less than 2 second averaging time, body position, airflow recorded by  oral-nasal thermal sensor and nasal air pressure transducer, plus  respiratory effort recorded by calibrated respiratory inductance  plethysmography (RIP), flow volume loop, sound and video. Sleep staging  and scoring followed the standard put forth by the American Academy of  Sleep Medicine and utilized the 4A obstructive hypopnea event  desaturation of 4 percent or greater.     INTERPRETATION:  This is a baseline sleep study and the study was  performed on 02/05/2020. The study was started at 09:18 p.m. and was  terminated at 05:04 a.m. with a total recording time of 465.8 minutes,  sleep period time was 462.3 minutes, the total sleep time was 398  minutes, and overall sleep efficiency was 85.5%. The sleep onset  latency was 3.4 minutes and wake after sleep onset was 64.3 minutes and  REM sleep latency was 91.5 minutes. SLEEP STAGING AND DISTRIBUTION SUMMARY:  Revealed the patient spent 42  minutes in stage I consisting of 10.6%, 268.5 minutes in stage II  consisting of 67.5%, 6 minutes in stage III consisting of 1.5%, 81.5  minutes in REM sleep consisting of 20.5% of total sleep time. RESPIRATORY EVENT ANALYSIS:  Revealed the patient had a total of 26  apneas. Out of 26 apneas, 25 were obstructive and one was central in  nature. The patient also had a total of 25 hypopneas, all of them were  obstructive in nature. The total number of apneas and hypopneas  recorded during the study were 51 with an apnea-hypopnea index of 7.7. The patient's REM sleep apnea-hypopnea index was 14. POSITIONAL ANALYSIS:  Revealed the patient spent 294.9 minutes in the  supine position, 7.7 minutes in the prone position, 1.9 minutes in the  left lateral position, 93.6 minutes in the right lateral position with a  supine apnea-hypopnea index of 9.8 whereas right lateral position  apnea-hypopnea index was 1.3. PERIODIC LIMB MOVEMENT ANALYSIS:  Revealed the patient had a total of  zero periodic limb movements. The patient had a total of 69 spontaneous  arousals with a spontaneous arousal index of 10.4. EKG MONITORING:  Revealed normal sinus rhythm. OXYGEN SATURATION MONITORING:  Revealed the patient had a maximum oxygen  desaturation to 85% with a mean oxygen saturation of 93.2%. The patient  spent a total of 1.3 minutes below oxygen saturation less than 88%.     The patient was found to have mild-to-severe snoring

## 2020-02-14 ENCOUNTER — OFFICE VISIT (OUTPATIENT)
Dept: PULMONOLOGY | Age: 35
End: 2020-02-14
Payer: COMMERCIAL

## 2020-02-14 VITALS
HEIGHT: 72 IN | WEIGHT: 188 LBS | BODY MASS INDEX: 25.47 KG/M2 | SYSTOLIC BLOOD PRESSURE: 142 MMHG | DIASTOLIC BLOOD PRESSURE: 96 MMHG | OXYGEN SATURATION: 98 % | RESPIRATION RATE: 16 BRPM | HEART RATE: 75 BPM

## 2020-02-14 PROCEDURE — 99214 OFFICE O/P EST MOD 30 MIN: CPT | Performed by: NURSE PRACTITIONER

## 2020-02-14 NOTE — PROGRESS NOTES
[facility], [address] [facility], 27 y.o.  [id number]    Nurses Notes   Issac Moy is here to review sleep study results  Study Results  Initial Study Date -  2-5-20  AHI -  7.7   Total Events - 51  (Apneas  25  Hypopneas 25  Central  1)  LM w/Arousals - 0  Sleep Efficiency - 85.5 % (Total Sleep Time - 398 min)  Time with Sats below 88% - 1.3 min  ESS 21  Weight 186 lbs    Interval History       Meryle Ester is a 27 y.o. old male who comes in to review the results of his recent sleep study, to answer questions and to explore options for treatment. History of severe hypersomnia over the last 5 years, falling asleep at work and almost driving at times. 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 ESS 21. Reports episodes of sleep paralysis (has been awhile), sleep talking. PMHx  Past Medical History:   Diagnosis Date    Alcohol abuse     Chronic allergic rhinitis     [facility] ([facility]     Major depressive disorder, single episode, severe with anxious distress ([facility] 12/7/2017     Past Surgical History:   Procedure Laterality Date    ANTERIOR CRUCIATE LIGAMENT REPAIR      FINGER SURGERY  1/10/2015    I and D left index finger    SKIN GRAFT      left ankle    TIBIA FRACTURE SURGERY      WISDOM TOOTH EXTRACTION       Social History     Tobacco Use    Smoking status: Never Smoker    Smokeless tobacco: Never Used   Substance Use Topics    Alcohol use:  Yes     Alcohol/week: 2.0 standard drinks     Types: 2 Cans of beer per week     Comment: weekends    Drug use: Not Currently     Types: Marijuana     Family History   Problem Relation Age of Onset    Sleep Apnea Mother    [facility] Other Father 61        car accident    No Known Problems Brother      Allergies  No Known Allergies  Meds  Current Outpatient Medications   Medication Sig Dispense Refill    loratadine (CLARITIN) 10 MG capsule Take 10 mg by mouth daily      fluticasone (FLONASE) 50 MCG/ACT nasal spray 1 spray by Each Nostril route daily       No current facility-administered medications for this visit. ROS  Review of Systems   General/Constitutional: No recent loss of weight or appetite changes. No fever or chills. HENT: Negative. Eyes: Negative. Upper respiratory tract: No nasal stuffiness or post nasal drip. Lower respiratory tract/ lungs: No cough or sputum production. No hemoptysis. Cardiovascular: No palpitations or chest pain. Gastrointestinal: No nausea or vomiting. Neurological: No focal neurologiacal weakness. Extremities: No edema. Musculoskeletal: No complaints. Genitourinary: No complaints. Hematological: Negative. Psychiatric/Behavioral: Negative. Skin: No itching. Exam  BP (!) 156/90 (Site: Left Upper Arm, Position: Sitting, Cuff Size: Medium Adult)   Pulse 75   Resp 16   Ht 6' (1.829 m)   Wt 188 lb (85.3 kg)   SpO2 98% Comment: on RA  BMI 25.50 kg/m²    Body mass index is 25.5 kg/m². Oxygen level - Room Air  Physical Exam   General Appearance - Moderately built, moderately nourished, in no acute distress. HEENT - Head is normocephalic, atraumatic. PERRL. Oral mucosa pink and moist, no oral thrush. Mallampati Score - III (soft palate, base of uvula visible). Neck - Supple, symmetrical, trachea midline and soft. Lungs - Clear to auscultation, no wheezes, rales or rhonchi, aeration good. Cardiovascular - Heart sounds are normal. Regular rhythm normal rate without murmur, gallop or rub. Abdomen - Soft, nontender, non-distended. Neurologic - Alert and oriented x 3. Skin - No bruising or bleeding. Extremities - No cyanosis, clubbing or edema. Assessment   Diagnosis Orders   1. ARISTEO (obstructive sleep apnea)  DME Order for CPAP as OP   2. Hypersomnia      severe ESS 21   3. Sleep talking     4. Sleep paralysis     5. Hypertension, unspecified type      Elevated BP   6. Environmental and seasonal allergies          Recommendations  I reviewed the sleep study results in detail with Ulices Singh.  We discussed various treatment options including positional therapy, OAT and PAP therapies along with the benefits and limitations of each. We also discussed the health risks with untreated ARISTEO. Due to the severity of symptoms, PAP treatment is recommended. He agrees to proceed with APAP set up mask fitting. Due to the severity of his hypersomnia, follow closely for additional sleep disorder such as Narcolepsy. History of sleep paralysis with early sleep/REM onset with study. Monitor sleep talking. PCP follow up on BP. Follow up 8 weeks after PAP set up with download.     Electronically signed by JESSICA Miller CNP on 2/14/2020 at 10:49 AM

## 2021-01-17 ENCOUNTER — APPOINTMENT (OUTPATIENT)
Dept: ULTRASOUND IMAGING | Age: 36
End: 2021-01-17
Payer: COMMERCIAL

## 2021-01-17 ENCOUNTER — HOSPITAL ENCOUNTER (EMERGENCY)
Age: 36
Discharge: HOME | End: 2021-01-17
Attending: EMERGENCY MEDICINE
Payer: COMMERCIAL

## 2021-01-17 VITALS
WEIGHT: 180 LBS | OXYGEN SATURATION: 98 % | HEIGHT: 72 IN | RESPIRATION RATE: 12 BRPM | SYSTOLIC BLOOD PRESSURE: 130 MMHG | DIASTOLIC BLOOD PRESSURE: 92 MMHG | HEART RATE: 87 BPM | TEMPERATURE: 98.1 F | BODY MASS INDEX: 24.38 KG/M2

## 2021-01-17 DIAGNOSIS — N45.1 ACUTE EPIDIDYMITIS: Primary | ICD-10-CM

## 2021-01-17 LAB
ANION GAP SERPL CALCULATED.3IONS-SCNC: 12 MEQ/L (ref 8–16)
BACTERIA: ABNORMAL /HPF
BASOPHILS # BLD: 0.6 %
BASOPHILS ABSOLUTE: 0 THOU/MM3 (ref 0–0.1)
BILIRUBIN URINE: NEGATIVE
BLOOD, URINE: NEGATIVE
BUN BLDV-MCNC: 6 MG/DL (ref 7–22)
CALCIUM SERPL-MCNC: 10.2 MG/DL (ref 8.5–10.5)
CASTS 2: ABNORMAL /LPF
CASTS UA: ABNORMAL /LPF
CHARACTER, URINE: CLEAR
CHLORIDE BLD-SCNC: 101 MEQ/L (ref 98–111)
CO2: 26 MEQ/L (ref 23–33)
COLOR: YELLOW
CREAT SERPL-MCNC: 1 MG/DL (ref 0.4–1.2)
CRYSTALS, UA: ABNORMAL
EOSINOPHIL # BLD: 5.1 %
EOSINOPHILS ABSOLUTE: 0.3 THOU/MM3 (ref 0–0.4)
EPITHELIAL CELLS, UA: ABNORMAL /HPF
ERYTHROCYTE [DISTWIDTH] IN BLOOD BY AUTOMATED COUNT: 12.8 % (ref 11.5–14.5)
ERYTHROCYTE [DISTWIDTH] IN BLOOD BY AUTOMATED COUNT: 47.8 FL (ref 35–45)
GFR SERPL CREATININE-BSD FRML MDRD: 84 ML/MIN/1.73M2
GLUCOSE BLD-MCNC: 91 MG/DL (ref 70–108)
GLUCOSE URINE: NEGATIVE MG/DL
HCT VFR BLD CALC: 47 % (ref 42–52)
HEMOGLOBIN: 15.8 GM/DL (ref 14–18)
IMMATURE GRANS (ABS): 0.01 THOU/MM3 (ref 0–0.07)
IMMATURE GRANULOCYTES: 0.1 %
KETONES, URINE: ABNORMAL
LEUKOCYTE ESTERASE, URINE: NEGATIVE
LYMPHOCYTES # BLD: 25.1 %
LYMPHOCYTES ABSOLUTE: 1.7 THOU/MM3 (ref 1–4.8)
MCH RBC QN AUTO: 34 PG (ref 26–33)
MCHC RBC AUTO-ENTMCNC: 33.6 GM/DL (ref 32.2–35.5)
MCV RBC AUTO: 101.1 FL (ref 80–94)
MISCELLANEOUS 2: ABNORMAL
MONOCYTES # BLD: 9.7 %
MONOCYTES ABSOLUTE: 0.7 THOU/MM3 (ref 0.4–1.3)
NITRITE, URINE: NEGATIVE
NUCLEATED RED BLOOD CELLS: 0 /100 WBC
OSMOLALITY CALCULATION: 274.7 MOSMOL/KG (ref 275–300)
PH UA: 7.5 (ref 5–9)
PLATELET # BLD: 246 THOU/MM3 (ref 130–400)
PMV BLD AUTO: 9.9 FL (ref 9.4–12.4)
POTASSIUM REFLEX MAGNESIUM: 4.9 MEQ/L (ref 3.5–5.2)
PROTEIN UA: 30
RBC # BLD: 4.65 MILL/MM3 (ref 4.7–6.1)
RBC URINE: ABNORMAL /HPF
RENAL EPITHELIAL, UA: ABNORMAL
SEG NEUTROPHILS: 59.4 %
SEGMENTED NEUTROPHILS ABSOLUTE COUNT: 4 THOU/MM3 (ref 1.8–7.7)
SODIUM BLD-SCNC: 139 MEQ/L (ref 135–145)
SPECIFIC GRAVITY, URINE: 1.02 (ref 1–1.03)
UROBILINOGEN, URINE: 1 EU/DL (ref 0–1)
WBC # BLD: 6.8 THOU/MM3 (ref 4.8–10.8)
WBC UA: ABNORMAL /HPF
YEAST: ABNORMAL

## 2021-01-17 PROCEDURE — 99282 EMERGENCY DEPT VISIT SF MDM: CPT

## 2021-01-17 PROCEDURE — 87491 CHLMYD TRACH DNA AMP PROBE: CPT

## 2021-01-17 PROCEDURE — 6360000002 HC RX W HCPCS: Performed by: EMERGENCY MEDICINE

## 2021-01-17 PROCEDURE — 6370000000 HC RX 637 (ALT 250 FOR IP): Performed by: EMERGENCY MEDICINE

## 2021-01-17 PROCEDURE — 85025 COMPLETE CBC W/AUTO DIFF WBC: CPT

## 2021-01-17 PROCEDURE — 76870 US EXAM SCROTUM: CPT

## 2021-01-17 PROCEDURE — 2500000003 HC RX 250 WO HCPCS

## 2021-01-17 PROCEDURE — 81001 URINALYSIS AUTO W/SCOPE: CPT

## 2021-01-17 PROCEDURE — 36415 COLL VENOUS BLD VENIPUNCTURE: CPT

## 2021-01-17 PROCEDURE — 80048 BASIC METABOLIC PNL TOTAL CA: CPT

## 2021-01-17 PROCEDURE — 96372 THER/PROPH/DIAG INJ SC/IM: CPT

## 2021-01-17 PROCEDURE — 87591 N.GONORRHOEAE DNA AMP PROB: CPT

## 2021-01-17 PROCEDURE — 99214 OFFICE O/P EST MOD 30 MIN: CPT

## 2021-01-17 RX ORDER — DOXYCYCLINE HYCLATE 100 MG
100 TABLET ORAL 2 TIMES DAILY
Qty: 20 TABLET | Refills: 0 | Status: SHIPPED | OUTPATIENT
Start: 2021-01-17 | End: 2021-01-27

## 2021-01-17 RX ORDER — CEFTRIAXONE 1 G/1
500 INJECTION, POWDER, FOR SOLUTION INTRAMUSCULAR; INTRAVENOUS ONCE
Status: COMPLETED | OUTPATIENT
Start: 2021-01-17 | End: 2021-01-17

## 2021-01-17 RX ORDER — DOXYCYCLINE HYCLATE 100 MG
100 TABLET ORAL 2 TIMES DAILY
Qty: 20 TABLET | Refills: 0 | Status: SHIPPED | OUTPATIENT
Start: 2021-01-17 | End: 2021-01-17 | Stop reason: SDUPTHER

## 2021-01-17 RX ORDER — LIDOCAINE HYDROCHLORIDE 10 MG/ML
INJECTION, SOLUTION EPIDURAL; INFILTRATION; INTRACAUDAL; PERINEURAL
Status: COMPLETED
Start: 2021-01-17 | End: 2021-01-17

## 2021-01-17 RX ORDER — DOXYCYCLINE HYCLATE 100 MG
100 TABLET ORAL ONCE
Status: COMPLETED | OUTPATIENT
Start: 2021-01-17 | End: 2021-01-17

## 2021-01-17 RX ORDER — HYDROCODONE BITARTRATE AND ACETAMINOPHEN 5; 325 MG/1; MG/1
1 TABLET ORAL EVERY 6 HOURS PRN
Qty: 12 TABLET | Refills: 0 | Status: SHIPPED | OUTPATIENT
Start: 2021-01-17 | End: 2021-01-20

## 2021-01-17 RX ORDER — KETOROLAC TROMETHAMINE 30 MG/ML
15 INJECTION, SOLUTION INTRAMUSCULAR; INTRAVENOUS ONCE
Status: DISCONTINUED | OUTPATIENT
Start: 2021-01-17 | End: 2021-01-17 | Stop reason: HOSPADM

## 2021-01-17 RX ORDER — NAPROXEN 500 MG/1
500 TABLET ORAL 2 TIMES DAILY WITH MEALS
Qty: 30 TABLET | Refills: 0 | Status: SHIPPED | OUTPATIENT
Start: 2021-01-17 | End: 2022-02-02

## 2021-01-17 RX ADMIN — DOXYCYCLINE HYCLATE 100 MG: 100 TABLET ORAL at 19:24

## 2021-01-17 RX ADMIN — CEFTRIAXONE SODIUM 500 MG: 1 INJECTION, POWDER, FOR SOLUTION INTRAMUSCULAR; INTRAVENOUS at 19:24

## 2021-01-17 RX ADMIN — LIDOCAINE HYDROCHLORIDE: 10 INJECTION, SOLUTION EPIDURAL; INFILTRATION; INTRACAUDAL; PERINEURAL at 19:24

## 2021-01-17 ASSESSMENT — ENCOUNTER SYMPTOMS
TROUBLE SWALLOWING: 0
VOMITING: 0
STRIDOR: 0
BACK PAIN: 0
COUGH: 0
SHORTNESS OF BREATH: 0
WHEEZING: 0
VOICE CHANGE: 0
EYE DISCHARGE: 0
ABDOMINAL PAIN: 0
SORE THROAT: 0
DIARRHEA: 0
EYE PAIN: 0
SINUS PRESSURE: 0
EYE REDNESS: 0
NAUSEA: 0

## 2021-01-17 ASSESSMENT — PAIN DESCRIPTION - PAIN TYPE: TYPE: ACUTE PAIN

## 2021-01-17 ASSESSMENT — PAIN DESCRIPTION - LOCATION: LOCATION: SCROTUM

## 2021-01-17 ASSESSMENT — PAIN DESCRIPTION - DESCRIPTORS: DESCRIPTORS: ACHING

## 2021-01-17 ASSESSMENT — PAIN SCALES - GENERAL: PAINLEVEL_OUTOF10: 4

## 2021-01-17 NOTE — ED PROVIDER NOTES
[facility] ENCOUNTER          Pt Name: Key Gutiérrez  MRN: [id number]  [place] 1985  Date of evaluation: 1/17/2021  Emergency Physician: Daniel Conn, [facility]       Chief Complaint   Patient presents with    Groin Pain     right x 2 weeks      History obtained from the patient. HISTORY OF PRESENT ILLNESS    HPI  Key Gutiérrez is a 38 y.o. male who presents to the emergency department for evaluation of right testicle pain. Pain has been ongoing for 2 weeks. Pain is localized to the the right superior aspect of his testicle. Pain is worse with palpation. Reports associated nausea. No abdominal pain no flank pain. No dysuria no urethral discharge. No trauma. Reports he is sexually active with his fiancée. The patient has no other acute complaints at this time. REVIEW OF SYSTEMS   Review of Systems   Constitutional: Negative for chills and fever. Eyes: Negative for pain, redness and itching. Gastrointestinal: Negative for abdominal pain and rectal pain. Genitourinary: Positive for testicular pain. Negative for decreased urine volume, difficulty urinating, discharge, flank pain, genital sores, penile swelling, scrotal swelling and urgency. Musculoskeletal: Negative for back pain and joint swelling. Neurological: Negative for numbness. Hematological: Does not bruise/bleed easily.          PAST MEDICAL AND SURGICAL HISTORY     Past Medical History:   Diagnosis Date    Alcohol abuse     Chronic allergic rhinitis     Drug abuse ([address]     Major depressive disorder, single episode, severe with anxious distress ([address] 12/7/2017     Past Surgical History:   Procedure Laterality Date    ANTERIOR CRUCIATE LIGAMENT REPAIR      FINGER SURGERY  1/10/2015    I and D left index finger    SKIN GRAFT      left ankle    TIBIA FRACTURE SURGERY      WISDOM TOOTH EXTRACTION           MEDICATIONS     Current Facility-Administered Medications:     ketorolac (TORADOL) injection 15 mg, 15 mg, Intravenous, Once, Sung Drafts, DO    Current Outpatient Medications:     CPAP Machine MISC, by Does not apply route Please change APAP to CPAP pressure of 6 cm H20., Disp: 1 each, Rfl: 0    loratadine (CLARITIN) 10 MG capsule, Take 10 mg by mouth daily, Disp: , Rfl:       SOCIAL HISTORY     Social History     Social History Narrative    Not on file     Social History     Tobacco Use    Smoking status: Never Smoker    Smokeless tobacco: Never Used   Substance Use Topics    Alcohol use: Yes     Alcohol/week: 2.0 standard drinks     Types: 2 Cans of beer per week     Comment: weekends    Drug use: Not Currently     Types: Marijuana         ALLERGIES   No Known Allergies      FAMILY HISTORY     Family History   Problem Relation Age of Onset    Sleep Apnea Mother     Other Father 61        car accident    No Known Problems Brother          PHYSICAL EXAM     ED Triage Vitals   BP Temp Temp Source Pulse Resp SpO2 Height Weight   01/17/21 1559 01/17/21 1559 01/17/21 1639 01/17/21 1559 01/17/21 1559 01/17/21 1559 01/17/21 1639 01/17/21 1559   130/86 97.6 °F (36.4 °C) Oral 90 16 96 % 6' (1.829 m) 180 lb (81.6 kg)         Additional Vital Signs:  Vitals:    01/17/21 1639   BP: (!) 130/92   Pulse: 87   Resp: 12   Temp: 98.1 °F (36.7 °C)   SpO2: 98%       Physical Exam  Vitals signs and nursing note reviewed. Constitutional:       General: He is not in acute distress. Appearance: He is not ill-appearing or toxic-appearing. HENT:      Head: Normocephalic. Mouth/Throat:      Mouth: Mucous membranes are moist.   Cardiovascular:      Rate and Rhythm: Normal rate and regular rhythm. Pulses: Normal pulses. Heart sounds: Normal heart sounds. Pulmonary:      Effort: Pulmonary effort is normal.      Breath sounds: Normal breath sounds. Abdominal:      General: Abdomen is flat. Palpations: Abdomen is soft. Tenderness: There is no abdominal tenderness. There is no right CVA tenderness or left CVA tenderness. Hernia: There is no hernia in the left inguinal area or right inguinal area. Genitourinary:     Penis: Normal and circumcised. No tenderness, discharge or lesions. Testes: Cremasteric reflex is present. Right: Mass, tenderness or swelling not present. Left: Mass, tenderness or swelling not present. Epididymis:      Right: Enlarged. Tenderness present. Skin:     General: Skin is warm and dry. Neurological:      Mental Status: He is alert. Gait: Gait normal.         Initial vital signs and nursing assessment reviewed and normal.   Pulsoximetry is normal per my interpretation. MEDICAL DECISION MAKING   Initial Assessment: Given the patient's above chief complaint and findings on history and physical examination, I thought it was appropriate to consider the following emergency medical conditions: Orchitis, epididymitis, mumps, gonorrhea, chlamydia, bacterial UTI, kidney stone. Although some of these diagnoses are unlikely, they were considered in my medical decision making.     Plan: CBC, BMP, testicle US Symptomatic treatment with Toradol, abx for GC, and reassess         ED RESULTS   Laboratory results:  Labs Reviewed   CBC WITH AUTO DIFFERENTIAL - Abnormal; Notable for the following components:       Result Value    RBC 4.65 (*)     .1 (*)     MCH 34.0 (*)     RDW-SD 47.8 (*)     All other components within normal limits   BASIC METABOLIC PANEL W/ REFLEX TO MG FOR LOW K - Abnormal; Notable for the following components:    BUN 6 (*)     All other components within normal limits   URINE WITH REFLEXED MICRO - Abnormal; Notable for the following components:    Ketones, Urine TRACE (*)     Protein, UA 30 (*)     All other components within normal limits   OSMOLALITY - Abnormal; Notable for the following components:    Osmolality Calc 274.7 (*)     All other components within normal limits   GLOMERULAR FILTRATION RATE, ESTIMATED - Abnormal; Notable for the following components:    Est, Glom Filt Rate 84 (*)     All other components within normal limits   C. TRACHOMATIS / N. GONORRHOEAE, DNA   ANION GAP       Radiologic studies results:  US SCROTUM AND TESTICLES   Final Result       1. The right and left testes appear to be normal.   2. There is mild enlargement of the head of the epididymis on the right side. 3. There is scrotal wall thickening on the right side. 4. There are no significant hydroceles  noted. **This report has been created using voice recognition software. It may contain minor errors which are inherent in voice recognition technology. **      Final report electronically signed by DR Vanda Donohue on 1/17/2021 7:05 PM          ED Medications administered this visit:   Medications   cefTRIAXone (ROCEPHIN) injection 500 mg (500 mg Intramuscular Given 1/17/21 1924)   doxycycline hyclate (VIBRA-TABS) tablet 100 mg (100 mg Oral Given 1/17/21 1924)   lidocaine PF 1 % injection (  Given 1/17/21 1924)         ED COURSE      Ultrasound positive for epididymitis. Clinically patient appears to have epididymitis. He was treated with doxycycline and Rocephin. He was given information for urology follow-up. The diagnosis, extensive differential diagnosis, laboratory and imaging findings were discussed at the bedside. The patient was an active participant in their care. They are agreeable to the plan of care. All questions and concerns were addressed at the time of the encounter. MEDICATION CHANGES     DISCHARGE MEDICATIONS:  New Prescriptions    No medications on file            FINAL DISPOSITION     Final diagnoses:   Acute epididymitis     Condition: condition: good  Dispo: Discharge to home    PATIENT REFERRED TO:  [address] [facility]  [address] [facility].  [address] [facility]  Call in 1 day  Schedule an appointment in the next week. Critical Care Time   None      This transcription was electronically signed. Parts of this transcriptions may have been dictated by use of voice recognition software and electronically transcribed, and parts may have been transcribed with the assistance of an ED scribe. The transcription may contain errors not detected in proofreading.     Electronically Signed: Anabel Chery, 01/17/21, 6:36 PM      Anabel Chery DO  01/18/21 0020

## 2021-01-17 NOTE — ED PROVIDER NOTES
[address] [facility] [address] [address] EMERGENCY DEPT  [facility]       Chief Complaint   Patient presents with    Groin Swelling     right x 2 weeks        Nurses Notes reviewed and I agree except as noted in the HPI. HISTORY OF PRESENT ILLNESS   Lesley Segura is a 38 y.o. male who presents with 2-week history of right testicular pain and swelling, with pain currently rated 5 out of 10 in severity. No trauma to the groin, fever, vomiting, purulent drainage, rash. No previous history of inguinal hernia or scrotal pathology. No history of diabetes. REVIEW OF SYSTEMS     Review of Systems   Constitutional: Negative for appetite change, chills, fatigue, fever and unexpected weight change. HENT: Negative for congestion, ear discharge, ear pain, sinus pressure, sneezing, sore throat, trouble swallowing and voice change. Eyes: Negative for pain, discharge and redness. Respiratory: Negative for cough, shortness of breath, wheezing and stridor. Cardiovascular: Negative for chest pain and leg swelling. Gastrointestinal: Negative for abdominal pain, diarrhea, nausea and vomiting. Genitourinary: Positive for testicular pain. Negative for dysuria, frequency, hematuria and urgency. Right testicular pain and swelling   Musculoskeletal: Negative for arthralgias, back pain, myalgias and neck pain. Skin: Negative for rash. Neurological: Negative for dizziness, syncope, weakness and headaches. Hematological: Negative for adenopathy. Psychiatric/Behavioral: Negative for behavioral problems, confusion, sleep disturbance and suicidal ideas. The patient is not nervous/anxious. All other systems reviewed and are negative.       PAST MEDICAL HISTORY         Diagnosis Date    Alcohol abuse     Chronic allergic rhinitis     Drug abuse ([address]     Major depressive disorder, single episode, severe with anxious distress ([facility] 12/7/2017       SURGICAL HISTORY     Patient  has a past surgical history that includes Anterior cruciate ligament repair; Skin graft; Tibia fracture surgery; [place] tooth extraction; and Finger surgery (1/10/2015). CURRENT MEDICATIONS       Previous Medications    CPAP MACHINE MISC    by Does not apply route Please change APAP to CPAP pressure of 6 cm H20. LORATADINE (CLARITIN) 10 MG CAPSULE    Take 10 mg by mouth daily       ALLERGIES     Patient is has No Known Allergies. FAMILY HISTORY     Patient'sfamily history includes No Known Problems in his brother; Other (age of onset: 61) in his father; Sleep Apnea in his mother. SOCIAL HISTORY     Patient  reports that he has never smoked. He has never used smokeless tobacco. He reports current alcohol use of about 2.0 standard drinks of alcohol per week. He reports previous drug use. Drug: Marijuana. PHYSICAL EXAM     ED TRIAGE VITALS  BP: 130/86, Temp: 97.6 °F (36.4 °C), Pulse: 90, Resp: 16, SpO2: 96 %  Physical Exam  Vitals signs and nursing note reviewed. Constitutional:       General: He is not in acute distress. Appearance: He is well-developed. He is not ill-appearing. Comments: Moist membranes, normal airway   HENT:      Head: Normocephalic and atraumatic. Right Ear: Tympanic membrane and external ear normal.      Left Ear: Tympanic membrane and external ear normal.      Nose: Nose normal.      Mouth/Throat:      Pharynx: No oropharyngeal exudate. Comments: Oropharynx normal  Eyes:      General: No scleral icterus. Right eye: No discharge. Left eye: No discharge. Extraocular Movements:      Right eye: Normal extraocular motion. Left eye: Normal extraocular motion. Conjunctiva/sclera: Conjunctivae normal.      Pupils: Pupils are equal, round, and reactive to light. Comments: Conjunctiva clear   Neck:      Musculoskeletal: Normal range of motion. Thyroid: No thyromegaly. Vascular: No JVD.       Comments: No meningismus  Cardiovascular:      Rate and Rhythm: Normal rate and regular rhythm. Pulses: Normal pulses. Heart sounds: Normal heart sounds, S1 normal and S2 normal. No murmur. No friction rub. No gallop. Pulmonary:      Effort: Pulmonary effort is normal. No respiratory distress. Breath sounds: Normal breath sounds. No stridor. No decreased breath sounds, wheezing, rhonchi or rales. Comments: No cough, lungs clear  Chest:      Chest wall: No tenderness. Abdominal:      General: Bowel sounds are normal. There is no distension. Palpations: Abdomen is soft. There is no mass. Tenderness: There is no abdominal tenderness. There is no right CVA tenderness, left CVA tenderness, guarding or rebound. Comments: Soft nontender   Genitourinary:     Penis: Circumcised. Testes:         Right: Mass not present. Left: Mass not present. Comments: Right testicular tenderness and swelling, no torsion or mass. No hernia. Musculoskeletal: Normal range of motion. General: No tenderness. Lumbar back: Normal.   Lymphadenopathy:      Cervical: Cervical adenopathy present. Right cervical: Superficial cervical adenopathy present. No deep cervical adenopathy. Left cervical: Superficial cervical adenopathy present. No deep cervical adenopathy. Skin:     General: Skin is warm and dry. Findings: No erythema or rash. Comments: No rash or bruising   Neurological:      Mental Status: He is alert and oriented to person, place, and time. Cranial Nerves: No cranial nerve deficit. Motor: No abnormal muscle tone. Coordination: Coordination normal.      Deep Tendon Reflexes: Reflexes are normal and symmetric. Reflexes normal.      Comments: Appropriate, no focal findings   Psychiatric:         Behavior: Behavior normal.         Thought Content: Thought content normal.         Judgment: Judgment normal.         DIAGNOSTIC RESULTS   Labs: No results found for this visit on 01/17/21.     IMAGING:  No orders to display     URGENT CARE COURSE:     Vitals:    01/17/21 1559   BP: 130/86   Pulse: 90   Resp: 16   Temp: 97.6 °F (36.4 °C)   SpO2: 96%   Weight: 180 lb (81.6 kg)       Medications - No data to display  PROCEDURES:  None  FINALIMPRESSION      1. Pain in right testicle    2. Testicular swelling, right        DISPOSITION/PLAN   DISPOSITION Decision To Transfer 01/17/2021 04:14:16 PM  Patient transferred to [address] [place] [address] ED per his request.  He is stable for private vehicle transfer. Patient accepted in transfer by Robyn Bhatia [address] [place] [address] ED charge nurse at 0700.   Patient instructed to maintain n.p.o. status  PATIENT REFERRED TO:  to [address] [place] [address] ED      to [address] [place] [address] ED    DISCHARGE MEDICATIONS:  New Prescriptions    No medications on file     Current Discharge Medication List          MD Freda Levine MD  01/17/21 7427

## 2021-01-18 ASSESSMENT — ENCOUNTER SYMPTOMS
RECTAL PAIN: 0
ABDOMINAL PAIN: 0
EYE ITCHING: 0
EYE PAIN: 0
EYE REDNESS: 0
BACK PAIN: 0

## 2021-01-20 LAB
CHLAMYDIA TRACHOMATIS AMPLIFIED DET: NEGATIVE
NEISSERIA GONORRHOEAE BY AMP: NEGATIVE
SPECIMEN SOURCE: NORMAL

## 2021-01-26 ENCOUNTER — OFFICE VISIT (OUTPATIENT)
Dept: UROLOGY | Age: 36
End: 2021-01-26
Payer: COMMERCIAL

## 2021-01-26 VITALS — BODY MASS INDEX: 24.12 KG/M2 | TEMPERATURE: 97.5 F | HEIGHT: 72 IN | WEIGHT: 178.1 LBS

## 2021-01-26 DIAGNOSIS — N45.1 RIGHT EPIDIDYMITIS: Primary | ICD-10-CM

## 2021-01-26 PROCEDURE — 99204 OFFICE O/P NEW MOD 45 MIN: CPT | Performed by: UROLOGY

## 2021-01-26 RX ORDER — IBUPROFEN 800 MG/1
800 TABLET ORAL
Qty: 30 TABLET | Refills: 0 | Status: SHIPPED | OUTPATIENT
Start: 2021-01-26 | End: 2022-02-02

## 2021-01-26 RX ORDER — CIPROFLOXACIN 500 MG/1
500 TABLET, FILM COATED ORAL 2 TIMES DAILY
Qty: 28 TABLET | Refills: 0 | Status: SHIPPED | OUTPATIENT
Start: 2021-01-26 | End: 2021-02-09

## 2021-01-26 NOTE — PROGRESS NOTES
[facility] [address] [id number]  Dept: [phone]  Dept Fax: [phone]: [address] [facility]     Patient:  Shanell Curtis  YOB: 1985  Date: 1/30/2021    The patient is a 38 y.o. male who presents today for evaluation of the following problems: right epididymitis   Chief Complaint   Patient presents with    Advice Only     New patient er follow up epididymis     referred/consultation requested by No primary care provider on file. Jareth Ibrahim HISTORY OF PRESENT ILLNESS:     Right epididymitis   Onset was  3 weeks ago  Overall, the problem(s) are worse. Severity is described as moderate. Associated Symptoms: No dysuria, no gross hematuria. Current Pain Severity: 6    Pain in the right testicle for the last 3 weeks. This pain radiates up into his abdomen. This is a new issue. 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 Denies any issues urinating  He was given doxy 100mg BID for 10 days. He has noticed no improvement   Exam: left side normal. Right side epididymitis         Summary of Previous Records:  ED records-  Pt comes to the ED with right testicle pain. Pt states he's had it intermittently for the pasdt couple of weeks. Pt denies swelling or problems going to the bathroom. Requested/reviewed records from No primary care provider on file. office and/or outside physician/EMR    (Patient's old records have been requested, reviewed and pertinent findings summarized in today's note.)    Procedures Today: N/A      Last several PSA's:  No results found for: PSA    Last total testosterone:  No results found for: TESTOSTERONE    Urinalysis today:  No results found for this visit on 01/26/21.     Last BUN and creatinine:  Lab Results   Component Value Date    BUN 6 (L) 01/17/2021     Lab Results   Component Value GRAFT      left ankle    TIBIA FRACTURE SURGERY      WISDOM TOOTH EXTRACTION       Family History   Problem Relation Age of Onset    Sleep Apnea Mother    [facility] Other Father 61        car accident    No Known Problems Brother      Outpatient Medications Marked as Taking for the 21 encounter (Office Visit) with Gallo Bryson MD   Medication Sig Dispense Refill    ibuprofen (ADVIL;MOTRIN) 800 MG tablet Take 1 tablet by mouth 3 times daily (with meals) 30 tablet 0    ciprofloxacin (CIPRO) 500 MG tablet Take 1 tablet by mouth 2 times daily for 14 days 28 tablet 0    [] doxycycline hyclate (VIBRA-TABS) 100 MG tablet Take 1 tablet by mouth 2 times daily for 10 days 20 tablet 0    naproxen (NAPROSYN) 500 MG tablet Take 1 tablet by mouth 2 times daily (with meals) 30 tablet 0    loratadine (CLARITIN) 10 MG capsule Take 10 mg by mouth daily         Patient has no known allergies. Social History     Tobacco Use   Smoking Status Never Smoker   Smokeless Tobacco Never Used      (If patient a smoker, smoking cessation counseling offered)   Social History     Substance and Sexual Activity   Alcohol Use Yes    Alcohol/week: 2.0 standard drinks    Types: 2 Cans of beer per week    Comment: weekends       REVIEW OF SYSTEMS:  Constitutional: negative  Eyes: negative  Respiratory: negative  Cardiovascular: negative  Gastrointestinal: negative  Genitourinary: see HPI  Musculoskeletal: negative  Skin: negative   Neurological: negative  Hematological/Lymphatic: negative  Psychological: negative        Physical Exam:    This a 38 y.o. male  Vitals:    21 1343   Temp: 97.5 °F (36.4 °C)     Body mass index is 24.15 kg/m². Constitutional: Patient in no acute distress; Neuro: alert and oriented to person place and time. Psych: Mood and affect normal.  Skin: warm, dry  Lungs: Respiratory effort normal, Symmetric chest rise  Cardiovascular:  Normal peripheral pulses;  Regular rate, radial pulses palpable  Abdomen: Soft, non-tender, non-distended with no CVA, flank pain, hepatosplenomegaly or hernia. Kidneys normal.  Bladder non-tender and not distended. Lymphatics: no palpable lymphadenopathy  Minimal/no edema in bilateral lower extremities        Assessment and Plan        1. Right epididymitis               Plan:        Right epididymitis - on doxy from ER. Continue med. Start cipro 500mg bid and ibu 800    If pain persists- we will get a ct scan to rule out distal ureteral stone . Prescriptions Ordered:  Orders Placed This Encounter   Medications    ibuprofen (ADVIL;MOTRIN) 800 MG tablet     Sig: Take 1 tablet by mouth 3 times daily (with meals)     Dispense:  30 tablet     Refill:  0    ciprofloxacin (CIPRO) 500 MG tablet     Sig: Take 1 tablet by mouth 2 times daily for 14 days     Dispense:  28 tablet     Refill:  0      Orders Placed:  No orders of the defined types were placed in this encounter.            Maia Schumacher MD

## 2021-02-02 ENCOUNTER — TELEPHONE (OUTPATIENT)
Dept: UROLOGY | Age: 36
End: 2021-02-02

## 2021-02-02 DIAGNOSIS — N45.1 ACUTE EPIDIDYMITIS: ICD-10-CM

## 2021-02-02 DIAGNOSIS — R10.9 RIGHT FLANK PAIN: Primary | ICD-10-CM

## 2021-02-02 RX ORDER — HYDROCODONE BITARTRATE AND ACETAMINOPHEN 5; 325 MG/1; MG/1
1 TABLET ORAL EVERY 6 HOURS PRN
Qty: 12 TABLET | Refills: 0 | Status: CANCELLED | OUTPATIENT
Start: 2021-02-02 | End: 2021-02-05

## 2021-02-02 NOTE — TELEPHONE ENCOUNTER
Patient scheduled for his CT scan abd/pelvis without contrast on 02- at 9:30am with an arrival of 9:00am at [facility]. Patient notified and voiced understanding.

## 2021-02-02 NOTE — TELEPHONE ENCOUNTER
Patient would like a refill on the norco. He is completely out. He continues to have pain in the scrotum that is continuous and radiates into right front stomach rated 5-6 at times on scale of 0-10. He started the cipro and takes motrin. He denies increase redness, warmth, edema, fever, or chills. Message sent to Dr Diaz Sessions via perfect serve.

## 2021-02-02 NOTE — TELEPHONE ENCOUNTER
Received message from Mandeep Mcginnis stating [address] [facility] it and also get a ct abdomen pelvis no contrast for flank pain    Please schedule. Thank you.

## 2021-02-04 NOTE — TELEPHONE ENCOUNTER
Patient rescheduled his CT scan to 02- at [facility] Radiology. Patient scheduled to see Dr. Trace Govea on 02-05. Could you please reschedule his appointment to after the CT scan?   Thank you

## 2021-02-17 ENCOUNTER — TELEPHONE (OUTPATIENT)
Dept: UROLOGY | Age: 36
End: 2021-02-17

## 2021-02-17 NOTE — TELEPHONE ENCOUNTER
Pt no showed CT scan at [facility] 2-10-21. Needs appt 2-23-21 rescheduled as Dr Kaylen Estes had wanted to review imaging at that appt. Can you please call and push appt back and let pt know he needs to call and reschedule CT scan with Central Scheduling?! Thanks.

## 2021-09-14 ENCOUNTER — HOSPITAL ENCOUNTER (EMERGENCY)
Age: 36
Discharge: HOME | End: 2021-09-14

## 2021-09-14 VITALS
RESPIRATION RATE: 16 BRPM | OXYGEN SATURATION: 98 % | TEMPERATURE: 97.6 F | DIASTOLIC BLOOD PRESSURE: 103 MMHG | SYSTOLIC BLOOD PRESSURE: 174 MMHG | WEIGHT: 180 LBS | HEIGHT: 72 IN | HEART RATE: 82 BPM | BODY MASS INDEX: 24.38 KG/M2

## 2021-09-14 DIAGNOSIS — R36.9 ABNORMAL PENILE DISCHARGE: Primary | ICD-10-CM

## 2021-09-14 DIAGNOSIS — Z11.3 SCREEN FOR STD (SEXUALLY TRANSMITTED DISEASE): ICD-10-CM

## 2021-09-14 LAB
BILIRUBIN URINE: NEGATIVE
BLOOD, URINE: ABNORMAL
CHARACTER, URINE: ABNORMAL
CHLAMYDIA TRACHOMATIS BY RT-PCR: DETECTED
COLOR: ABNORMAL
CT/NG SOURCE: ABNORMAL
GLUCOSE URINE: NEGATIVE MG/DL
KETONES, URINE: ABNORMAL
LEUKOCYTE ESTERASE, URINE: ABNORMAL
NEISSERIA GONORRHOEAE BY RT-PCR: DETECTED
NITRITE, URINE: NEGATIVE
PH, URINE: 6 (ref 5–9)
PROTEIN, URINE: NEGATIVE MG/DL
SPECIFIC GRAVITY, URINE: 1.02 (ref 1–1.03)
UROBILINOGEN, URINE: 1 EU/DL (ref 0.2–1)

## 2021-09-14 PROCEDURE — 6360000002 HC RX W HCPCS: Performed by: NURSE PRACTITIONER

## 2021-09-14 PROCEDURE — 6370000000 HC RX 637 (ALT 250 FOR IP): Performed by: NURSE PRACTITIONER

## 2021-09-14 PROCEDURE — 99213 OFFICE O/P EST LOW 20 MIN: CPT

## 2021-09-14 PROCEDURE — 87491 CHLMYD TRACH DNA AMP PROBE: CPT

## 2021-09-14 PROCEDURE — 81003 URINALYSIS AUTO W/O SCOPE: CPT

## 2021-09-14 PROCEDURE — 87591 N.GONORRHOEAE DNA AMP PROB: CPT

## 2021-09-14 PROCEDURE — 2500000003 HC RX 250 WO HCPCS: Performed by: NURSE PRACTITIONER

## 2021-09-14 PROCEDURE — 87086 URINE CULTURE/COLONY COUNT: CPT

## 2021-09-14 PROCEDURE — 99213 OFFICE O/P EST LOW 20 MIN: CPT | Performed by: NURSE PRACTITIONER

## 2021-09-14 PROCEDURE — 96372 THER/PROPH/DIAG INJ SC/IM: CPT

## 2021-09-14 RX ORDER — AZITHROMYCIN 250 MG/1
1000 TABLET, FILM COATED ORAL ONCE
Status: COMPLETED | OUTPATIENT
Start: 2021-09-14 | End: 2021-09-14

## 2021-09-14 RX ADMIN — AZITHROMYCIN MONOHYDRATE 1000 MG: 250 TABLET ORAL at 13:36

## 2021-09-14 RX ADMIN — LIDOCAINE HYDROCHLORIDE 500 MG: 10 INJECTION, SOLUTION EPIDURAL; INFILTRATION; INTRACAUDAL; PERINEURAL at 13:37

## 2021-09-14 ASSESSMENT — ENCOUNTER SYMPTOMS
CHOKING: 0
COUGH: 0
WHEEZING: 0
SHORTNESS OF BREATH: 0
APNEA: 0
CHEST TIGHTNESS: 0
STRIDOR: 0
ABDOMINAL PAIN: 0

## 2021-09-14 NOTE — ED NOTES
Denies allergy to rocephin, zithromax or lidocain. Meds given-see MAR. Pt tolerated.      Heladio Morris RN  09/14/21 [id number]

## 2021-09-14 NOTE — ED PROVIDER NOTES
[facility]  Urgent Care Encounter      CHIEF COMPLAINT       Chief Complaint   Patient presents with    Penile Discharge       Nurses Notes reviewed and I agree except as noted in the HPI. HISTORY OFPRESENT ILLNESS   Cecilio Marion is a 39 y.o. The history is provided by the patient. No  was used. Sexually Transmitted Diseases  This is a new (penile discharge) problem. The current episode started 2 days ago. The problem occurs constantly. The problem has not changed since onset. Pertinent negatives include no chest pain, no abdominal pain, no headaches and no shortness of breath. Nothing aggravates the symptoms. Nothing relieves the symptoms. He has tried nothing for the symptoms. REVIEW OF SYSTEMS     Review of Systems   Constitutional: Negative for activity change, appetite change, chills, diaphoresis, fatigue, fever and unexpected weight change. Respiratory: Negative for apnea, cough, choking, chest tightness, shortness of breath, wheezing and stridor. Cardiovascular: Negative for chest pain. Gastrointestinal: Negative for abdominal pain. Genitourinary: Positive for discharge. Negative for decreased urine volume, genital sores, penile pain, penile swelling, scrotal swelling, testicular pain and urgency. Neurological: Negative for dizziness, light-headedness and headaches. PAST MEDICAL HISTORY         Diagnosis Date    Alcohol abuse     Chronic allergic rhinitis     Drug abuse ([address]     Major depressive disorder, single episode, severe with anxious distress ([address] 12/7/2017       SURGICAL HISTORY     Patient  has a past surgical history that includes Anterior cruciate ligament repair; Skin graft; Tibia fracture surgery; [place] tooth extraction; and Finger surgery (1/10/2015). CURRENT MEDICATIONS       Previous Medications    CPAP MACHINE MISC    by Does not apply route Please change APAP to CPAP pressure of 6 cm H20.     IBUPROFEN (ADVIL;MOTRIN) 800 MG TABLET    Take 1 tablet by mouth 3 times daily (with meals)    LORATADINE (CLARITIN) 10 MG CAPSULE    Take 10 mg by mouth daily    NAPROXEN (NAPROSYN) 500 MG TABLET    Take 1 tablet by mouth 2 times daily (with meals)       ALLERGIES     Patient is has No Known Allergies. FAMILY HISTORY     Patient's family history includes No Known Problems in his brother; Other (age of onset: 61) in his father; Sleep Apnea in his mother. SOCIAL HISTORY     Patient  reports that he has never smoked. He has never used smokeless tobacco. He reports current alcohol use of about 2.0 standard drinks of alcohol per week. He reports previous drug use. Drug: Marijuana. PHYSICAL EXAM     ED TRIAGE VITALS  BP: (!) 131/93, Temp: 97.6 °F (36.4 °C), Pulse: 79, Resp: 16, SpO2: 98 %  Physical Exam  Vitals and nursing note reviewed. Constitutional:       General: He is not in acute distress. Appearance: Normal appearance. He is normal weight. He is not ill-appearing, toxic-appearing or diaphoretic. HENT:      Head: Normocephalic and atraumatic. Right Ear: External ear normal.      Left Ear: External ear normal.   Eyes:      Extraocular Movements: Extraocular movements intact. Conjunctiva/sclera: Conjunctivae normal.   Pulmonary:      Effort: Pulmonary effort is normal.   Musculoskeletal:         General: Normal range of motion. Cervical back: Normal range of motion. Skin:     General: Skin is warm. Neurological:      General: No focal deficit present. Mental Status: He is alert and oriented to person, place, and time. Psychiatric:         Mood and Affect: Mood normal.         Behavior: Behavior normal.         Thought Content:  Thought content normal.         Judgment: Judgment normal.         DIAGNOSTIC RESULTS   Labs:  Results for orders placed or performed during the hospital encounter of 09/14/21   Urinalysis   Result Value Ref Range    Glucose, Ur Negative NEGATIVE mg/dl Bilirubin Urine Negative NEGATIVE    Ketones, Urine Trace (A) NEGATIVE    Specific Gravity, Urine 1.025 1.002 - 1.030    Blood, Urine Trace-intact NEGATIVE    pH, Urine 6.00 5.0 - 9.0    Protein, Urine Negative NEGATIVE mg/dl    Urobilinogen, Urine 1.00 0.2 - 1.0 eu/dl    Nitrite, Urine Negative NEGATIVE    Leukocyte Esterase, Urine Moderate (A) NEGATIVE    Color, UA Dark yellow (A) STRAW-YELLOW    Character, Urine Slightly Cloudy CLEAR-SL CLOUD       IMAGING:  No orders to display     URGENT CARE COURSE:     Vitals:    09/14/21 1306   BP: (!) 131/93   Pulse: 79   Resp: 16   Temp: 97.6 °F (36.4 °C)   TempSrc: Temporal   SpO2: 98%   Weight: 180 lb (81.6 kg)   Height: 6' (1.829 m)       Medications   azithromycin (ZITHROMAX) tablet 1,000 mg (1,000 mg Oral Given 9/14/21 1336)   cefTRIAXone (ROCEPHIN) 500 mg in lidocaine 1 % 1 mL IM Injection (500 mg IntraMUSCular Given 9/14/21 1337)     PROCEDURES:  None  FINAL IMPRESSION      1. Abnormal penile discharge    2. Screen for STD (sexually transmitted disease)        DISPOSITION/PLAN   Decision To Discharge     No sex x 7 days  Use protection  Notify partners  Monitor for any changes such as rash, lesions, or urinary issues  Follow up with PCP x 1 week  Go to ED for any changes  For Culture Results  Call in 3-5 days after your visit. To obtain results.     FOR MORE INFORMATION ABOUT STDS, CONTACT YOUR PHYSICIAN OR:    Jens Seals  Department  [facility]ually Transmitted [facility]     Address: [place] [address] [place], [place] [address] [address]   [phone]  Regarding syphilis and HIV testing as per [facility] guidelines  [address] [facility]     [phone]                  PATIENT REFERRED TO:  Jens Seals  Department  [address] [address] [phone]    Call       [facility] [address] [address]  Call today  [phone]    DISCHARGE MEDICATIONS:  New Prescriptions    No medications on file     Current Discharge Medication List          JESSICA Engel CNP, APRN - CNP  09/14/21 [id number]

## 2021-09-14 NOTE — ED TRIAGE NOTES
Patient ambulated to room and states yesterday he had burning with urination and today had a yellowish discharge

## 2021-09-16 LAB — URINE CULTURE, ROUTINE: NORMAL

## 2022-01-31 ENCOUNTER — TELEPHONE (OUTPATIENT)
Dept: FAMILY MEDICINE CLINIC | Age: 37
End: 2022-01-31

## 2022-01-31 NOTE — TELEPHONE ENCOUNTER
Rosio is Cristo Levi, he is scheduled for new patient on 2-15-22.     1. Will you accept him? Looks like [facility] scheduled him without checking with us. 2. He is having RED symptoms. Can I schedule him today/tomorrow for RED appt with you?

## 2022-02-02 ENCOUNTER — OFFICE VISIT (OUTPATIENT)
Dept: FAMILY MEDICINE CLINIC | Age: 37
End: 2022-02-02

## 2022-02-02 VITALS
DIASTOLIC BLOOD PRESSURE: 88 MMHG | RESPIRATION RATE: 16 BRPM | WEIGHT: 177 LBS | TEMPERATURE: 98 F | SYSTOLIC BLOOD PRESSURE: 130 MMHG | OXYGEN SATURATION: 97 % | BODY MASS INDEX: 24.01 KG/M2 | HEART RATE: 90 BPM

## 2022-02-02 DIAGNOSIS — U07.1 COVID: Primary | ICD-10-CM

## 2022-02-02 DIAGNOSIS — J01.90 ACUTE SINUSITIS, RECURRENCE NOT SPECIFIED, UNSPECIFIED LOCATION: ICD-10-CM

## 2022-02-02 LAB
Lab: NORMAL
QC PASS/FAIL: NORMAL
SARS-COV-2 RDRP RESP QL NAA+PROBE: NEGATIVE

## 2022-02-02 PROCEDURE — 87635 SARS-COV-2 COVID-19 AMP PRB: CPT | Performed by: NURSE PRACTITIONER

## 2022-02-02 PROCEDURE — 99203 OFFICE O/P NEW LOW 30 MIN: CPT | Performed by: NURSE PRACTITIONER

## 2022-02-02 RX ORDER — PREDNISONE 10 MG/1
10 TABLET ORAL 2 TIMES DAILY
Qty: 10 TABLET | Refills: 0 | Status: SHIPPED | OUTPATIENT
Start: 2022-02-02 | End: 2022-02-07

## 2022-02-02 RX ORDER — BENZONATATE 200 MG/1
200 CAPSULE ORAL 3 TIMES DAILY PRN
Qty: 30 CAPSULE | Refills: 0 | Status: SHIPPED | OUTPATIENT
Start: 2022-02-02 | End: 2022-02-09

## 2022-02-02 RX ORDER — CEFUROXIME AXETIL 250 MG/1
250 TABLET ORAL 2 TIMES DAILY
Qty: 20 TABLET | Refills: 0 | Status: SHIPPED | OUTPATIENT
Start: 2022-02-02 | End: 2022-02-12

## 2022-02-02 SDOH — ECONOMIC STABILITY: FOOD INSECURITY: WITHIN THE PAST 12 MONTHS, THE FOOD YOU BOUGHT JUST DIDN'T LAST AND YOU DIDN'T HAVE MONEY TO GET MORE.: NEVER TRUE

## 2022-02-02 SDOH — ECONOMIC STABILITY: FOOD INSECURITY: WITHIN THE PAST 12 MONTHS, YOU WORRIED THAT YOUR FOOD WOULD RUN OUT BEFORE YOU GOT MONEY TO BUY MORE.: NEVER TRUE

## 2022-02-02 ASSESSMENT — PATIENT HEALTH QUESTIONNAIRE - PHQ9
6. FEELING BAD ABOUT YOURSELF - OR THAT YOU ARE A FAILURE OR HAVE LET YOURSELF OR YOUR FAMILY DOWN: 0
7. TROUBLE CONCENTRATING ON THINGS, SUCH AS READING THE NEWSPAPER OR WATCHING TELEVISION: 0
4. FEELING TIRED OR HAVING LITTLE ENERGY: 0
3. TROUBLE FALLING OR STAYING ASLEEP: 0
8. MOVING OR SPEAKING SO SLOWLY THAT OTHER PEOPLE COULD HAVE NOTICED. OR THE OPPOSITE, BEING SO FIGETY OR RESTLESS THAT YOU HAVE BEEN MOVING AROUND A LOT MORE THAN USUAL: 0
10. IF YOU CHECKED OFF ANY PROBLEMS, HOW DIFFICULT HAVE THESE PROBLEMS MADE IT FOR YOU TO DO YOUR WORK, TAKE CARE OF THINGS AT HOME, OR GET ALONG WITH OTHER PEOPLE: 0
5. POOR APPETITE OR OVEREATING: 0
1. LITTLE INTEREST OR PLEASURE IN DOING THINGS: 0
9. THOUGHTS THAT YOU WOULD BE BETTER OFF DEAD, OR OF HURTING YOURSELF: 0
SUM OF ALL RESPONSES TO PHQ QUESTIONS 1-9: 0
2. FEELING DOWN, DEPRESSED OR HOPELESS: 0
SUM OF ALL RESPONSES TO PHQ QUESTIONS 1-9: 0
SUM OF ALL RESPONSES TO PHQ QUESTIONS 1-9: 0
SUM OF ALL RESPONSES TO PHQ9 QUESTIONS 1 & 2: 0
SUM OF ALL RESPONSES TO PHQ QUESTIONS 1-9: 0

## 2022-02-02 ASSESSMENT — SOCIAL DETERMINANTS OF HEALTH (SDOH): HOW HARD IS IT FOR YOU TO PAY FOR THE VERY BASICS LIKE FOOD, HOUSING, MEDICAL CARE, AND HEATING?: NOT HARD AT ALL

## 2022-02-02 NOTE — LETTER
[address]  [address]  [facility] [address]  Phone: [phone]  Fax: [phone]    JESSICA Esteban CNP        February 2, 2022     Patient: Linda Gonzalez   YOB: 1985   Date of Visit: 2/2/2022       To Whom it May Concern:    Katarinadany Adin was seen in my clinic on 2/2/2022. He may return to work on 02/07/2022. If you have any questions or concerns, please don't hesitate to call.     Sincerely,         JESSICA Esteban CNP

## 2022-02-02 NOTE — PATIENT INSTRUCTIONS
Patient Education        10 Things to Do When You Have COVID-19      Stay home. Don't go to school, work, or public areas. And don't use public transportation, ride-shares, or taxis unless you have no choice. Leave your home only if you need to get medical care. But call the doctor's office first so they know you're coming. And wear a mask. Ask before leaving isolation. Follow your doctor's advice about when it is safe for you to leave isolation. Wear a mask when you are around other people. It can help stop the spread of the virus. Limit contact with people in your home. If possible, stay in a separate bedroom and use a separate bathroom. Avoid contact with pets and other animals. If possible, have a friend or family member care for them while you're sick. Cover your mouth and nose with a tissue when you cough or sneeze. Then throw the tissue in the trash right away. Wash your hands often, especially after you cough or sneeze. Use soap and water, and scrub for at least 20 seconds. If soap and water aren't available, use an alcohol-based hand . Don't share personal household items. These include bedding, towels, cups and glasses, and eating utensils. Clean and disinfect your home every day. Use household  or disinfectant wipes or sprays. If needed, take acetaminophen (Tylenol) or ibuprofen (Advil, Motrin) to relieve fever and body aches. Read and follow all instructions on the label. Current as of: March 26, 2021               Content Version: 13.1  © 2006-2021 [employer]. Care instructions adapted under license by [facility] ([facility]). If you have questions about a medical condition or this instruction, always ask your healthcare professional. [place] [address] any warranty or liability for your use of this information.          Patient Education        Coronavirus (ORKAJ-42): Care Instructions  Overview  The coronavirus disease (COVID-19) is caused by a virus. Symptoms may include a fever, a cough, and shortness of breath. It can spread through droplets from coughing and sneezing, breathing, and singing. The virus also can spread when people are in close contact with someone who is infected. Most people have mild symptoms and can take care of themselves at home. If their symptoms get worse, they may need care in a hospital. Treatment may include medicines to reduce symptoms, plus breathing support such as oxygen therapy or a ventilator. It's important to not spread the virus to others. If you have COVID-19, wear a mask anytime you are around other people. It can help stop the spread of the virus. You need to isolate yourself while you are sick. Leave your home only if you need to get medical care or testing. Follow-up care is a key part of your treatment and safety. Be sure to make and go to all appointments, and call your doctor if you are having problems. It's also a good idea to know your test results and keep a list of the medicines you take. How can you care for yourself at home? · Get extra rest. It can help you feel better. · Drink plenty of fluids. This helps replace fluids lost from fever. Fluids may also help ease a scratchy throat. · You can take acetaminophen (Tylenol) or ibuprofen (Advil, Motrin) to reduce a fever. It may also help with muscle and body aches. Read and follow all instructions on the label. · Use petroleum jelly on sore skin. This can help if the skin around your nose and lips becomes sore from rubbing a lot with tissues. If you use oxygen, use a water-based product instead of petroleum jelly. · Keep track of symptoms such as fever and shortness of breath. This can help you know if you need to call your doctor. It can also help you know when it's safe to be around other people. · In some cases, your doctor might suggest that you get a pulse oximeter.   How can you self-isolate when you have COVID-19? If you have COVID-19, there are things you can do to help avoid spreading the virus to others. · Limit contact with people in your home. If possible, stay in a separate bedroom and use a separate bathroom. · Wear a mask when you are around other people. · If you have to leave home, avoid crowds and try to stay at least 6 feet away from other people. · Avoid contact with pets and other animals. · Cover your mouth and nose with a tissue when you cough or sneeze. Then throw it in the trash right away. · Wash your hands often, especially after you cough or sneeze. Use soap and water, and scrub for at least 20 seconds. If soap and water aren't available, use an alcohol-based hand . · Don't share personal household items. These include bedding, towels, cups and glasses, and eating utensils. · [address] in the warmest water allowed for the fabric type, and dry it completely. It's okay to wash other people's laundry with yours. · Clean and disinfect your home. Use household  and disinfectant wipes or sprays. When can you end self-isolation for COVID-19? If you know or think that you have the virus, you will need to self-isolate. You can be around others after:  · It's been at least 10 days since your symptoms started and  · You haven't had a fever for 24 hours without taking medicines to lower the fever and  · Your symptoms are improving. If you tested positive but have no symptoms, you can end isolation after 10 days. But if you start to have symptoms, follow the steps above. Ask your doctor if you need to be tested before you end isolation. This is especially important if you have a weakened immune system. When should you call for help? Call 911 anytime you think you may need emergency care. For example, call if you have life-threatening symptoms, such as:    · You have severe trouble breathing.  (You can't talk at all.)     · You have constant chest pain or pressure.     · You are severely dizzy or lightheaded.     · You are confused or can't think clearly.     · You have pale, gray, or blue-colored skin or lips.     · You pass out (lose consciousness) or are very hard to wake up. Call your doctor now or seek immediate medical care if:    · You have moderate trouble breathing. (You can't speak a full sentence.)     · You are coughing up blood (more than about 1 teaspoon).     · You have signs of low blood pressure. These include feeling lightheaded; being too weak to stand; and having cold, pale, clammy skin. Watch closely for changes in your health, and be sure to contact your doctor if:    · Your symptoms get worse.     · You are not getting better as expected.     · You have new or worse symptoms of anxiety, depression, nightmares, or flashbacks. Call before you go to the doctor's office. Follow their instructions. And wear a mask. Current as of: July 1, 2021               Content Version: 13.1  © 2006-2021 [employer]. Care instructions adapted under license by [facility] ([facility]). If you have questions about a medical condition or this instruction, always ask your healthcare professional. [place] [address] any warranty or liability for your use of this information.

## 2022-02-03 ASSESSMENT — ENCOUNTER SYMPTOMS
CHEST TIGHTNESS: 0
BACK PAIN: 0
SINUS PAIN: 1
ABDOMINAL DISTENTION: 0
COUGH: 1
ABDOMINAL PAIN: 0
WHEEZING: 0
SORE THROAT: 1
SHORTNESS OF BREATH: 0
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
## 2022-10-15 ENCOUNTER — APPOINTMENT (OUTPATIENT)
Dept: GENERAL RADIOLOGY | Age: 37
End: 2022-10-15
Payer: COMMERCIAL

## 2022-10-15 ENCOUNTER — HOSPITAL ENCOUNTER (EMERGENCY)
Age: 37
Discharge: HOME | End: 2022-10-15
Attending: EMERGENCY MEDICINE
Payer: COMMERCIAL

## 2022-10-15 VITALS
BODY MASS INDEX: 23.7 KG/M2 | SYSTOLIC BLOOD PRESSURE: 177 MMHG | DIASTOLIC BLOOD PRESSURE: 106 MMHG | TEMPERATURE: 98.2 F | HEART RATE: 88 BPM | HEIGHT: 72 IN | OXYGEN SATURATION: 100 % | WEIGHT: 175 LBS | RESPIRATION RATE: 16 BRPM

## 2022-10-15 DIAGNOSIS — S39.012A STRAIN OF LUMBAR REGION, INITIAL ENCOUNTER: ICD-10-CM

## 2022-10-15 DIAGNOSIS — S16.1XXA STRAIN OF NECK MUSCLE, INITIAL ENCOUNTER: ICD-10-CM
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
## 2022-10-15 PROCEDURE — 6370000000 HC RX 637 (ALT 250 FOR IP): Performed by: EMERGENCY MEDICINE

## 2022-10-15 PROCEDURE — 72040 X-RAY EXAM NECK SPINE 2-3 VW: CPT

## 2022-10-15 PROCEDURE — 73130 X-RAY EXAM OF HAND: CPT

## 2022-10-15 PROCEDURE — 72100 X-RAY EXAM L-S SPINE 2/3 VWS: CPT

## 2022-10-15 PROCEDURE — 99284 EMERGENCY DEPT VISIT MOD MDM: CPT

## 2022-10-15 PROCEDURE — 6360000002 HC RX W HCPCS: Performed by: EMERGENCY MEDICINE

## 2022-10-15 PROCEDURE — 96372 THER/PROPH/DIAG INJ SC/IM: CPT

## 2022-10-15 RX ORDER — LIDOCAINE 4 G/G
1 PATCH TOPICAL DAILY
Status: DISCONTINUED | OUTPATIENT
Start: 2022-10-15 | End: 2022-10-15 | Stop reason: HOSPADM

## 2022-10-15 RX ORDER — HYDROCODONE BITARTRATE AND ACETAMINOPHEN 5; 325 MG/1; MG/1
1 TABLET ORAL ONCE
Status: COMPLETED | OUTPATIENT
Start: 2022-10-15 | End: 2022-10-15

## 2022-10-15 RX ORDER — NAPROXEN 500 MG/1
500 TABLET ORAL 2 TIMES DAILY
Qty: 14 TABLET | Refills: 0 | Status: SHIPPED | OUTPATIENT
Start: 2022-10-15 | End: 2022-10-22

## 2022-10-15 RX ORDER — KETOROLAC TROMETHAMINE 30 MG/ML
15 INJECTION, SOLUTION INTRAMUSCULAR; INTRAVENOUS ONCE
Status: COMPLETED | OUTPATIENT
Start: 2022-10-15 | End: 2022-10-15

## 2022-10-15 RX ORDER — CYCLOBENZAPRINE HCL 10 MG
10 TABLET ORAL NIGHTLY PRN
Qty: 10 TABLET | Refills: 0 | Status: SHIPPED | OUTPATIENT
Start: 2022-10-15 | End: 2022-10-25

## 2022-10-15 RX ADMIN — KETOROLAC TROMETHAMINE 15 MG: 30 INJECTION, SOLUTION INTRAMUSCULAR; INTRAVENOUS at 17:39

## 2022-10-15 RX ADMIN — HYDROCODONE BITARTRATE AND ACETAMINOPHEN 1 TABLET: 5; 325 TABLET ORAL at 17:39

## 2022-10-15 ASSESSMENT — PAIN DESCRIPTION - PAIN TYPE: TYPE: ACUTE PAIN

## 2022-10-15 ASSESSMENT — PAIN DESCRIPTION - FREQUENCY: FREQUENCY: INTERMITTENT

## 2022-10-15 ASSESSMENT — PAIN SCALES - GENERAL: PAINLEVEL_OUTOF10: 9

## 2022-10-15 ASSESSMENT — PAIN DESCRIPTION - LOCATION: LOCATION: HEAD;NECK;BACK

## 2022-10-15 ASSESSMENT — PAIN - FUNCTIONAL ASSESSMENT: PAIN_FUNCTIONAL_ASSESSMENT: 0-10

## 2022-10-15 ASSESSMENT — PAIN DESCRIPTION - DESCRIPTORS: DESCRIPTORS: ACHING

## 2022-10-15 NOTE — Clinical Note
Maude Ballesteros was seen and treated in our emergency department on 10/15/2022. Pt need to sleep on soft surface due to back pain.     Iliana Ha, DO
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
## 2023-06-22 ENCOUNTER — HOSPITAL ENCOUNTER (EMERGENCY)
Age: 38
Discharge: HOME | End: 2023-06-22

## 2023-06-22 ENCOUNTER — APPOINTMENT (OUTPATIENT)
Dept: GENERAL RADIOLOGY | Age: 38
End: 2023-06-22

## 2023-06-22 VITALS
TEMPERATURE: 98.7 F | SYSTOLIC BLOOD PRESSURE: 133 MMHG | OXYGEN SATURATION: 97 % | HEART RATE: 116 BPM | RESPIRATION RATE: 18 BRPM | DIASTOLIC BLOOD PRESSURE: 86 MMHG
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
## 2023-06-22 DIAGNOSIS — L03.012 CELLULITIS OF FINGER OF LEFT HAND: ICD-10-CM

## 2023-06-22 PROCEDURE — 73130 X-RAY EXAM OF HAND: CPT

## 2023-06-22 PROCEDURE — 99283 EMERGENCY DEPT VISIT LOW MDM: CPT

## 2023-06-22 RX ORDER — AMOXICILLIN AND CLAVULANATE POTASSIUM 875; 125 MG/1; MG/1
1 TABLET, FILM COATED ORAL 2 TIMES DAILY
Qty: 20 TABLET | Refills: 0 | Status: SHIPPED | OUTPATIENT
Start: 2023-06-22 | End: 2023-07-02

## 2023-06-22 ASSESSMENT — PAIN - FUNCTIONAL ASSESSMENT: PAIN_FUNCTIONAL_ASSESSMENT: NONE - DENIES PAIN

## 2023-06-23 NOTE — ED PROVIDER NOTES
been sent were not resulted at the time of this patient visit)    [address] / ED COURSE:     Summary of Patient Presentation:      1) Number and Complexity of Problems            Problem List This Visit:         Chief Complaint   Patient presents with    Wound Check             Differential Diagnosis includes (but not limited to):  Dig bite, osteomyelitis, cellulitis                   Pertinent Comorbid Conditions:    Reviewed per the chart    2)  Data Reviewed (none if left blank, additional information can be found in the ED course)          My Independent interpretations:     EKG:           Imaging: negative xray    Labs:                        Decision Rules/Clinical Scores utilized:                            External Documentation Reviewed:         Previous patient encounter documents & history available on EMR was reviewed              See Formal Diagnostic Results above for the lab and radiology tests and orders. 3)  Treatment and Disposition         ED Reassessment:            Case discussed with consulting clinician/attending physician:            Shared Decision-Making was performed and disposition discussed with the       Patient/Family and questions answered          Social determinants of health impacting treatment or disposition:            Code Status:  Not discussed at this visit        MDM    Vitals Reviewed:    Vitals:    06/22/23 2018   BP: 133/86   Pulse: (!) 116   Resp: 18   Temp: 98.7 °F (37.1 °C)   TempSrc: Oral   SpO2: 97%       The patient was seen and examined. Appropriate diagnostic testing was performed and results reviewed with the patient. The patient's xray is negative. FROM intact as well as sensation. Patient was bit by a dog therefore he needs oral abx. No signs of osteomyelitis. I reviewed home care and close followup with patient. He is agreeable. DC home       The results of pertinent diagnostic studies and exam findings were discussed.  The patients

## 2024-04-07 ENCOUNTER — HOSPITAL ENCOUNTER (EMERGENCY)
Age: 39
Discharge: HOME | End: 2024-04-07

## 2024-04-07 ENCOUNTER — APPOINTMENT (OUTPATIENT)
Dept: GENERAL RADIOLOGY | Age: 39
End: 2024-04-07

## 2024-04-07 VITALS
DIASTOLIC BLOOD PRESSURE: 94 MMHG | TEMPERATURE: 98 F | SYSTOLIC BLOOD PRESSURE: 143 MMHG | WEIGHT: 180 LBS | HEART RATE: 98 BPM | BODY MASS INDEX: 24.41 KG/M2 | RESPIRATION RATE: 18 BRPM | OXYGEN SATURATION: 100 %

## 2024-04-07 DIAGNOSIS — S62.337A CLOSED DISPLACED FRACTURE OF NECK OF FIFTH METACARPAL BONE OF LEFT HAND, INITIAL ENCOUNTER: Primary | ICD-10-CM

## 2024-04-07 PROCEDURE — 99283 EMERGENCY DEPT VISIT LOW MDM: CPT

## 2024-04-07 PROCEDURE — 29125 APPL SHORT ARM SPLINT STATIC: CPT

## 2024-04-07 PROCEDURE — 73130 X-RAY EXAM OF HAND: CPT

## 2024-04-07 RX ORDER — IBUPROFEN 800 MG/1
800 TABLET ORAL EVERY 8 HOURS PRN
Qty: 15 TABLET | Refills: 0 | Status: SHIPPED | OUTPATIENT
Start: 2024-04-07

## 2024-04-07 RX ORDER — HYDROCODONE BITARTRATE AND ACETAMINOPHEN 5; 325 MG/1; MG/1
1 TABLET ORAL EVERY 6 HOURS PRN
Qty: 10 TABLET | Refills: 0 | Status: SHIPPED | OUTPATIENT
Start: 2024-04-07 | End: 2024-04-10

## 2024-04-07 ASSESSMENT — PAIN DESCRIPTION - PAIN TYPE: TYPE: ACUTE PAIN

## 2024-04-07 ASSESSMENT — PAIN DESCRIPTION - LOCATION: LOCATION: HAND

## 2024-04-07 ASSESSMENT — PAIN DESCRIPTION - ORIENTATION: ORIENTATION: LEFT

## 2024-04-07 NOTE — ED PROVIDER NOTES
sling dispensed with instructions.   I examined the patient post-splint application and the patient remained neurovascularly intact with good sensation and movement maintained distally. There were no signs of compartment symptoms before or after splint application.  Discharge plan was discussed.  Patient was comfortable with plan of care. I have given the patient strict written and verbal instructions about care at home, follow-up, and signs and symptoms of worsening of condition and they did verbalize understanding.    CRITICAL CARE:   None    CONSULTS:  None    PROCEDURES:  None    FINAL IMPRESSION      1. Closed displaced fracture of neck of fifth metacarpal bone of left hand, initial encounter          DISPOSITION/PLAN     1. Closed displaced fracture of neck of fifth metacarpal bone of left hand, initial encounter        PATIENT REFERRED TO:  [facility]  [address] [facility] [address] [phone]  Go to         DISCHARGE MEDICATIONS:  Discharge Medication List as of 4/7/2024  4:17 AM        START taking these medications    Details   ibuprofen (ADVIL;MOTRIN) 800 MG tablet Take 1 tablet by mouth every 8 hours as needed for Pain, Disp-15 tablet, R-0Normal      HYDROcodone-acetaminophen (NORCO) 5-325 MG per tablet Take 1 tablet by mouth every 6 hours as needed for Pain (Not relieved by ibuprofen) for up to 3 days. Intended supply: 3 days. Take lowest dose possible to manage pain Max Daily Amount: 4 tablets, Disp-10 tablet, R-0Normal             (Please note that portions of this note were completed with a voice recognition program.  Efforts were made to edit the dictations but occasionally words are mis-transcribed.)    Dimple Choi PA-C 04/07/24 5:28 AM    JONY Glibert Jennifer, PA-C  04/07/24 [id number]

## 2024-06-15 ENCOUNTER — APPOINTMENT (OUTPATIENT)
Dept: GENERAL RADIOLOGY | Age: 39
End: 2024-06-15

## 2024-06-15 ENCOUNTER — APPOINTMENT (OUTPATIENT)
Dept: CT IMAGING | Age: 39
End: 2024-06-15

## 2024-06-15 ENCOUNTER — HOSPITAL ENCOUNTER (EMERGENCY)
Age: 39
Discharge: HOME | End: 2024-06-15
Attending: EMERGENCY MEDICINE

## 2024-06-15 VITALS
HEIGHT: 72 IN | SYSTOLIC BLOOD PRESSURE: 131 MMHG | BODY MASS INDEX: 25.06 KG/M2 | HEART RATE: 69 BPM | TEMPERATURE: 98.5 F | OXYGEN SATURATION: 97 % | RESPIRATION RATE: 16 BRPM | DIASTOLIC BLOOD PRESSURE: 98 MMHG | WEIGHT: 185 LBS

## 2024-06-15 DIAGNOSIS — S20.419A ABRASION OF BACK, UNSPECIFIED LATERALITY, INITIAL ENCOUNTER: ICD-10-CM

## 2024-06-15 DIAGNOSIS — R91.1 PULMONARY NODULE: ICD-10-CM

## 2024-06-15 DIAGNOSIS — S81.812A LACERATION OF CALF, LEFT, INITIAL ENCOUNTER: Primary | ICD-10-CM
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
## 2024-06-15 DIAGNOSIS — S92.425A CLOSED NONDISPLACED FRACTURE OF DISTAL PHALANX OF LEFT GREAT TOE, INITIAL ENCOUNTER: ICD-10-CM

## 2024-06-15 LAB
ABO: NORMAL
ANION GAP SERPL CALC-SCNC: 11 MEQ/L (ref 8–16)
ANTIBODY SCREEN: NORMAL
BUN SERPL-MCNC: 14 MG/DL (ref 7–22)
CHLORIDE SERPL-SCNC: 98 MEQ/L (ref 98–111)
CO2 SERPL-SCNC: 26 MEQ/L (ref 23–33)
CREAT SERPL-MCNC: 0.9 MG/DL (ref 0.4–1.2)
DEPRECATED RDW RBC AUTO: 46.5 FL (ref 35–45)
ERYTHROCYTE [DISTWIDTH] IN BLOOD BY AUTOMATED COUNT: 12.8 % (ref 11.5–14.5)
GFR SERPL CREATININE-BSD FRML MDRD: > 90 ML/MIN/1.73M2
GLUCOSE SERPL-MCNC: 107 MG/DL (ref 70–108)
HCT VFR BLD AUTO: 42.6 % (ref 42–52)
HGB BLD-MCNC: 14.7 GM/DL (ref 14–18)
MCH RBC QN AUTO: 34.1 PG (ref 26–33)
MCHC RBC AUTO-ENTMCNC: 34.5 GM/DL (ref 32.2–35.5)
MCV RBC AUTO: 98.8 FL (ref 80–94)
OSMOLALITY SERPL CALC.SUM OF ELEC: 271 MOSMOL/KG (ref 275–300)
PLATELET # BLD AUTO: 196 THOU/MM3 (ref 130–400)
PMV BLD AUTO: 10.5 FL (ref 9.4–12.4)
POTASSIUM SERPL-SCNC: 4.2 MEQ/L (ref 3.5–5.2)
RBC # BLD AUTO: 4.31 MILL/MM3 (ref 4.7–6.1)
RH FACTOR: NORMAL
SODIUM SERPL-SCNC: 135 MEQ/L (ref 135–145)
WBC # BLD AUTO: 7.2 THOU/MM3 (ref 4.8–10.8)

## 2024-06-15 PROCEDURE — 2500000003 HC RX 250 WO HCPCS

## 2024-06-15 PROCEDURE — 73130 X-RAY EXAM OF HAND: CPT

## 2024-06-15 PROCEDURE — 73110 X-RAY EXAM OF WRIST: CPT

## 2024-06-15 PROCEDURE — 36415 COLL VENOUS BLD VENIPUNCTURE: CPT

## 2024-06-15 PROCEDURE — 6360000002 HC RX W HCPCS: Performed by: STUDENT IN AN ORGANIZED HEALTH CARE EDUCATION/TRAINING PROGRAM

## 2024-06-15 PROCEDURE — 96374 THER/PROPH/DIAG INJ IV PUSH: CPT

## 2024-06-15 PROCEDURE — 12032 INTMD RPR S/A/T/EXT 2.6-7.5: CPT

## 2024-06-15 PROCEDURE — 86900 BLOOD TYPING SEROLOGIC ABO: CPT

## 2024-06-15 PROCEDURE — 80051 ELECTROLYTE PANEL: CPT

## 2024-06-15 PROCEDURE — 6360000002 HC RX W HCPCS

## 2024-06-15 PROCEDURE — 72125 CT NECK SPINE W/O DYE: CPT

## 2024-06-15 PROCEDURE — 2580000003 HC RX 258

## 2024-06-15 PROCEDURE — 82565 ASSAY OF CREATININE: CPT

## 2024-06-15 PROCEDURE — 90471 IMMUNIZATION ADMIN: CPT

## 2024-06-15 PROCEDURE — 6360000004 HC RX CONTRAST MEDICATION: Performed by: STUDENT IN AN ORGANIZED HEALTH CARE EDUCATION/TRAINING PROGRAM

## 2024-06-15 PROCEDURE — 96375 TX/PRO/DX INJ NEW DRUG ADDON: CPT

## 2024-06-15 PROCEDURE — 73590 X-RAY EXAM OF LOWER LEG: CPT

## 2024-06-15 PROCEDURE — 6370000000 HC RX 637 (ALT 250 FOR IP)

## 2024-06-15 PROCEDURE — 73630 X-RAY EXAM OF FOOT: CPT

## 2024-06-15 PROCEDURE — 73564 X-RAY EXAM KNEE 4 OR MORE: CPT

## 2024-06-15 PROCEDURE — 96376 TX/PRO/DX INJ SAME DRUG ADON: CPT

## 2024-06-15 PROCEDURE — 82947 ASSAY GLUCOSE BLOOD QUANT: CPT

## 2024-06-15 PROCEDURE — 70450 CT HEAD/BRAIN W/O DYE: CPT

## 2024-06-15 PROCEDURE — 76376 3D RENDER W/INTRP POSTPROCES: CPT

## 2024-06-15 PROCEDURE — 90715 TDAP VACCINE 7 YRS/> IM: CPT

## 2024-06-15 PROCEDURE — 85027 COMPLETE CBC AUTOMATED: CPT

## 2024-06-15 PROCEDURE — 99285 EMERGENCY DEPT VISIT HI MDM: CPT

## 2024-06-15 PROCEDURE — 84520 ASSAY OF UREA NITROGEN: CPT

## 2024-06-15 PROCEDURE — 73090 X-RAY EXAM OF FOREARM: CPT

## 2024-06-15 PROCEDURE — 74177 CT ABD & PELVIS W/CONTRAST: CPT

## 2024-06-15 PROCEDURE — 71260 CT THORAX DX C+: CPT

## 2024-06-15 PROCEDURE — 86901 BLOOD TYPING SEROLOGIC RH(D): CPT

## 2024-06-15 PROCEDURE — 86850 RBC ANTIBODY SCREEN: CPT

## 2024-06-15 PROCEDURE — 73080 X-RAY EXAM OF ELBOW: CPT

## 2024-06-15 RX ORDER — LIDOCAINE HYDROCHLORIDE AND EPINEPHRINE 10; 10 MG/ML; UG/ML
20 INJECTION, SOLUTION INFILTRATION; PERINEURAL ONCE
Status: COMPLETED | OUTPATIENT
Start: 2024-06-15 | End: 2024-06-15

## 2024-06-15 RX ORDER — GINSENG 100 MG
CAPSULE ORAL ONCE
Status: COMPLETED | OUTPATIENT
Start: 2024-06-15 | End: 2024-06-15

## 2024-06-15 RX ORDER — FENTANYL CITRATE 50 UG/ML
50 INJECTION, SOLUTION INTRAMUSCULAR; INTRAVENOUS ONCE
Status: COMPLETED | OUTPATIENT
Start: 2024-06-15 | End: 2024-06-15

## 2024-06-15 RX ORDER — BACITRACIN ZINC AND POLYMYXIN B SULFATE 500; 1000 [USP'U]/G; [USP'U]/G
OINTMENT TOPICAL
Qty: 28.4 G | Refills: 0 | Status: SHIPPED | OUTPATIENT
Start: 2024-06-15 | End: 2024-06-22

## 2024-06-15 RX ORDER — ONDANSETRON 2 MG/ML
4 INJECTION INTRAMUSCULAR; INTRAVENOUS ONCE
Status: COMPLETED | OUTPATIENT
Start: 2024-06-15 | End: 2024-06-15

## 2024-06-15 RX ORDER — CEPHALEXIN 500 MG/1
500 CAPSULE ORAL 4 TIMES DAILY
Qty: 40 CAPSULE | Refills: 0 | Status: SHIPPED | OUTPATIENT
Start: 2024-06-15 | End: 2024-06-25

## 2024-06-15 RX ADMIN — HYDROMORPHONE HYDROCHLORIDE 1 MG: 1 INJECTION, SOLUTION INTRAMUSCULAR; INTRAVENOUS; SUBCUTANEOUS at 22:14

## 2024-06-15 RX ADMIN — ONDANSETRON 4 MG: 2 INJECTION INTRAMUSCULAR; INTRAVENOUS at 20:30

## 2024-06-15 RX ADMIN — IOPAMIDOL 80 ML: 755 INJECTION, SOLUTION INTRAVENOUS at 21:07

## 2024-06-15 RX ADMIN — TETANUS TOXOID, REDUCED DIPHTHERIA TOXOID AND ACELLULAR PERTUSSIS VACCINE, ADSORBED 0.5 ML: 5; 2.5; 8; 8; 2.5 SUSPENSION INTRAMUSCULAR at 21:27

## 2024-06-15 RX ADMIN — LIDOCAINE HYDROCHLORIDE AND EPINEPHRINE 20 ML: 10; 10 INJECTION, SOLUTION INFILTRATION; PERINEURAL at 23:46

## 2024-06-15 RX ADMIN — WATER 2000 MG: 1 INJECTION INTRAMUSCULAR; INTRAVENOUS; SUBCUTANEOUS at 21:24

## 2024-06-15 RX ADMIN — BACITRACIN: 500 OINTMENT TOPICAL at 23:45

## 2024-06-15 RX ADMIN — FENTANYL CITRATE 50 MCG: 50 INJECTION, SOLUTION INTRAMUSCULAR; INTRAVENOUS at 20:29

## 2024-06-15 RX ADMIN — FENTANYL CITRATE 50 MCG: 50 INJECTION, SOLUTION INTRAMUSCULAR; INTRAVENOUS at 21:21

## 2024-06-15 ASSESSMENT — PAIN - FUNCTIONAL ASSESSMENT
PAIN_FUNCTIONAL_ASSESSMENT: 0-10
PAIN_FUNCTIONAL_ASSESSMENT: 0-10

## 2024-06-15 ASSESSMENT — PAIN SCALES - GENERAL
PAINLEVEL_OUTOF10: 8
PAINLEVEL_OUTOF10: 10

## 2024-06-15 ASSESSMENT — PAIN DESCRIPTION - ONSET: ONSET: ON-GOING

## 2024-06-15 ASSESSMENT — PAIN DESCRIPTION - LOCATION: LOCATION: BACK;ARM;LEG

## 2024-06-15 ASSESSMENT — PAIN DESCRIPTION - FREQUENCY: FREQUENCY: CONTINUOUS

## 2024-06-15 ASSESSMENT — PAIN DESCRIPTION - PAIN TYPE: TYPE: ACUTE PAIN

## 2024-06-15 ASSESSMENT — PAIN DESCRIPTION - ORIENTATION: ORIENTATION: RIGHT;LEFT
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
## 2024-06-16 NOTE — DISCHARGE INSTRUCTIONS
Return to the emergency department immediately for any change or worsening symptoms including but not limited to chest pain, shortness of breath, dizziness, nausea or vomiting.  Please follow-up with OIO Monday morning in the walk-in clinic.

## 2024-09-03 ENCOUNTER — HOSPITAL ENCOUNTER (INPATIENT)
Age: 39
LOS: 3 days | Discharge: HOME | DRG: 896 | End: 2024-09-06
Attending: FAMILY MEDICINE | Admitting: FAMILY MEDICINE

## 2024-09-03 DIAGNOSIS — F10.230 ALCOHOL DEPENDENCE WITH UNCOMPLICATED WITHDRAWAL (HCC): Primary | ICD-10-CM

## 2024-09-03 DIAGNOSIS — R01.1 HEART MURMUR: ICD-10-CM

## 2024-09-03 PROBLEM — F10.931 ACUTE HYPERACTIVE ALCOHOL WITHDRAWAL DELIRIUM (HCC): Status: ACTIVE | Noted: 2024-09-03

## 2024-09-03 LAB
ALBUMIN SERPL BCG-MCNC: 3.9 G/DL (ref 3.5–5.1)
ALP SERPL-CCNC: 79 U/L (ref 38–126)
ALT SERPL W/O P-5'-P-CCNC: 22 U/L (ref 11–66)
AMPHETAMINES UR QL SCN: NEGATIVE
ANION GAP SERPL CALC-SCNC: 14 MEQ/L (ref 8–16)
AST SERPL-CCNC: 28 U/L (ref 5–40)
BARBITURATES UR QL SCN: NEGATIVE
BENZODIAZ UR QL SCN: NEGATIVE
BILIRUB SERPL-MCNC: 0.3 MG/DL (ref 0.3–1.2)
BUN SERPL-MCNC: 10 MG/DL (ref 7–22)
BZE UR QL SCN: POSITIVE
CALCIUM SERPL-MCNC: 8.7 MG/DL (ref 8.5–10.5)
CANNABINOIDS UR QL SCN: POSITIVE
CHLORIDE SERPL-SCNC: 100 MEQ/L (ref 98–111)
CO2 SERPL-SCNC: 22 MEQ/L (ref 23–33)
CREAT SERPL-MCNC: 0.9 MG/DL (ref 0.4–1.2)
EKG ATRIAL RATE: 123 BPM
EKG P AXIS: 57 DEGREES
EKG P-R INTERVAL: 128 MS
EKG Q-T INTERVAL: 338 MS
EKG QRS DURATION: 82 MS
EKG QTC CALCULATION (BAZETT): 483 MS
EKG R AXIS: 52 DEGREES
EKG T AXIS: 54 DEGREES
EKG VENTRICULAR RATE: 123 BPM
ETHANOL SERPL-MCNC: < 0.01 % (ref 0–0.08)
FENTANYL: NEGATIVE
GFR SERPL CREATININE-BSD FRML MDRD: > 90 ML/MIN/1.73M2
GLUCOSE SERPL-MCNC: 123 MG/DL (ref 70–108)
LIPASE SERPL-CCNC: 23.3 U/L (ref 5.6–51.3)
OPIATES UR QL SCN: NEGATIVE
OSMOLALITY SERPL CALC.SUM OF ELEC: 272.4 MOSMOL/KG (ref 275–300)
OXYCODONE: NEGATIVE
PCP UR QL SCN: NEGATIVE
POTASSIUM SERPL-SCNC: 4.1 MEQ/L (ref 3.5–5.2)
PROT SERPL-MCNC: 6.5 G/DL (ref 6.1–8)
SCAN OF BLOOD SMEAR: NORMAL
SODIUM SERPL-SCNC: 136 MEQ/L (ref 135–145)

## 2024-09-03 PROCEDURE — 99285 EMERGENCY DEPT VISIT HI MDM: CPT

## 2024-09-03 PROCEDURE — 82077 ASSAY SPEC XCP UR&BREATH IA: CPT

## 2024-09-03 PROCEDURE — 85025 COMPLETE CBC W/AUTO DIFF WBC: CPT

## 2024-09-03 PROCEDURE — 2580000003 HC RX 258: Performed by: NURSE PRACTITIONER

## 2024-09-03 PROCEDURE — 36415 COLL VENOUS BLD VENIPUNCTURE: CPT

## 2024-09-03 PROCEDURE — 6370000000 HC RX 637 (ALT 250 FOR IP): Performed by: NURSE PRACTITIONER

## 2024-09-03 PROCEDURE — 6360000002 HC RX W HCPCS: Performed by: FAMILY MEDICINE

## 2024-09-03 PROCEDURE — 80307 DRUG TEST PRSMV CHEM ANLYZR: CPT

## 2024-09-03 PROCEDURE — 1200000003 HC TELEMETRY R&B

## 2024-09-03 PROCEDURE — 93010 ELECTROCARDIOGRAM REPORT: CPT | Performed by: INTERNAL MEDICINE

## 2024-09-03 PROCEDURE — 93005 ELECTROCARDIOGRAM TRACING: CPT | Performed by: FAMILY MEDICINE

## 2024-09-03 PROCEDURE — 83690 ASSAY OF LIPASE: CPT

## 2024-09-03 PROCEDURE — 99223 1ST HOSP IP/OBS HIGH 75: CPT | Performed by: NURSE PRACTITIONER

## 2024-09-03 PROCEDURE — 6360000002 HC RX W HCPCS: Performed by: NURSE PRACTITIONER

## 2024-09-03 PROCEDURE — 80053 COMPREHEN METABOLIC PANEL: CPT

## 2024-09-03 RX ORDER — PHENOBARBITAL 32.4 MG/1
64.8 TABLET ORAL EVERY 6 HOURS PRN
Status: DISCONTINUED | OUTPATIENT
Start: 2024-09-03 | End: 2024-09-03

## 2024-09-03 RX ORDER — ACETAMINOPHEN 325 MG/1
650 TABLET ORAL EVERY 6 HOURS PRN
Status: DISCONTINUED | OUTPATIENT
Start: 2024-09-03 | End: 2024-09-06 | Stop reason: HOSPADM

## 2024-09-03 RX ORDER — SODIUM CHLORIDE 9 MG/ML
INJECTION, SOLUTION INTRAVENOUS PRN
Status: DISCONTINUED | OUTPATIENT
Start: 2024-09-03 | End: 2024-09-06 | Stop reason: HOSPADM

## 2024-09-03 RX ORDER — PHENOBARBITAL 32.4 MG/1
64.8 TABLET ORAL 4 TIMES DAILY
Status: DISCONTINUED | OUTPATIENT
Start: 2024-09-03 | End: 2024-09-03

## 2024-09-03 RX ORDER — MAGNESIUM SULFATE IN WATER 40 MG/ML
2000 INJECTION, SOLUTION INTRAVENOUS PRN
Status: DISCONTINUED | OUTPATIENT
Start: 2024-09-03 | End: 2024-09-06 | Stop reason: HOSPADM

## 2024-09-03 RX ORDER — ONDANSETRON 4 MG/1
4 TABLET, ORALLY DISINTEGRATING ORAL EVERY 8 HOURS PRN
Status: DISCONTINUED | OUTPATIENT
Start: 2024-09-03 | End: 2024-09-06 | Stop reason: HOSPADM

## 2024-09-03 RX ORDER — PHENOBARBITAL SODIUM 65 MG/ML
130 INJECTION, SOLUTION INTRAMUSCULAR; INTRAVENOUS
Status: DISCONTINUED | OUTPATIENT
Start: 2024-09-03 | End: 2024-09-06 | Stop reason: HOSPADM

## 2024-09-03 RX ORDER — POTASSIUM CHLORIDE 7.45 MG/ML
10 INJECTION INTRAVENOUS PRN
Status: DISCONTINUED | OUTPATIENT
Start: 2024-09-03 | End: 2024-09-06 | Stop reason: HOSPADM

## 2024-09-03 RX ORDER — MULTIVITAMIN WITH IRON
1 TABLET ORAL DAILY
Status: DISCONTINUED | OUTPATIENT
Start: 2024-09-04 | End: 2024-09-06 | Stop reason: HOSPADM

## 2024-09-03 RX ORDER — POTASSIUM CHLORIDE 1500 MG/1
40 TABLET, EXTENDED RELEASE ORAL PRN
Status: DISCONTINUED | OUTPATIENT
Start: 2024-09-03 | End: 2024-09-06 | Stop reason: HOSPADM

## 2024-09-03 RX ORDER — SODIUM CHLORIDE 0.9 % (FLUSH) 0.9 %
5-40 SYRINGE (ML) INJECTION PRN
Status: DISCONTINUED | OUTPATIENT
Start: 2024-09-03 | End: 2024-09-06 | Stop reason: HOSPADM

## 2024-09-03 RX ORDER — POLYETHYLENE GLYCOL 3350 17 G/17G
17 POWDER, FOR SOLUTION ORAL DAILY PRN
Status: DISCONTINUED | OUTPATIENT
Start: 2024-09-03 | End: 2024-09-06 | Stop reason: HOSPADM

## 2024-09-03 RX ORDER — PHENOBARBITAL SODIUM 65 MG/ML
130 INJECTION, SOLUTION INTRAMUSCULAR; INTRAVENOUS ONCE
Status: COMPLETED | OUTPATIENT
Start: 2024-09-03 | End: 2024-09-03

## 2024-09-03 RX ORDER — SODIUM CHLORIDE 0.9 % (FLUSH) 0.9 %
5-40 SYRINGE (ML) INJECTION EVERY 12 HOURS SCHEDULED
Status: DISCONTINUED | OUTPATIENT
Start: 2024-09-03 | End: 2024-09-06 | Stop reason: HOSPADM

## 2024-09-03 RX ORDER — PHENOBARBITAL SODIUM 65 MG/ML
260 INJECTION, SOLUTION INTRAMUSCULAR; INTRAVENOUS
Status: DISCONTINUED | OUTPATIENT
Start: 2024-09-03 | End: 2024-09-06 | Stop reason: HOSPADM

## 2024-09-03 RX ORDER — ACETAMINOPHEN 650 MG/1
650 SUPPOSITORY RECTAL EVERY 6 HOURS PRN
Status: DISCONTINUED | OUTPATIENT
Start: 2024-09-03 | End: 2024-09-06 | Stop reason: HOSPADM

## 2024-09-03 RX ORDER — PHENOBARBITAL 32.4 MG/1
32.4 TABLET ORAL 2 TIMES DAILY
Status: DISCONTINUED | OUTPATIENT
Start: 2024-09-05 | End: 2024-09-03

## 2024-09-03 RX ORDER — ONDANSETRON 2 MG/ML
4 INJECTION INTRAMUSCULAR; INTRAVENOUS EVERY 6 HOURS PRN
Status: DISCONTINUED | OUTPATIENT
Start: 2024-09-03 | End: 2024-09-06 | Stop reason: HOSPADM

## 2024-09-03 RX ORDER — PHENOBARBITAL 32.4 MG/1
32.4 TABLET ORAL 4 TIMES DAILY
Status: DISCONTINUED | OUTPATIENT
Start: 2024-09-04 | End: 2024-09-03

## 2024-09-03 RX ORDER — LANOLIN ALCOHOL/MO/W.PET/CERES
100 CREAM (GRAM) TOPICAL DAILY
Status: DISCONTINUED | OUTPATIENT
Start: 2024-09-04 | End: 2024-09-06 | Stop reason: HOSPADM

## 2024-09-03 RX ORDER — PHENOBARBITAL 32.4 MG/1
16.2 TABLET ORAL 2 TIMES DAILY
Status: DISCONTINUED | OUTPATIENT
Start: 2024-09-06 | End: 2024-09-03

## 2024-09-03 RX ORDER — FOLIC ACID 1 MG/1
1 TABLET ORAL DAILY
Status: DISCONTINUED | OUTPATIENT
Start: 2024-09-04 | End: 2024-09-06 | Stop reason: HOSPADM

## 2024-09-03 RX ORDER — ENOXAPARIN SODIUM 100 MG/ML
40 INJECTION SUBCUTANEOUS DAILY
Status: DISCONTINUED | OUTPATIENT
Start: 2024-09-04 | End: 2024-09-06 | Stop reason: HOSPADM

## 2024-09-03 RX ORDER — PHENOBARBITAL 32.4 MG/1
32.4 TABLET ORAL EVERY 6 HOURS PRN
Status: DISCONTINUED | OUTPATIENT
Start: 2024-09-04 | End: 2024-09-03

## 2024-09-03 RX ORDER — PHENOBARBITAL 32.4 MG/1
16.2 TABLET ORAL EVERY 6 HOURS PRN
Status: DISCONTINUED | OUTPATIENT
Start: 2024-09-06 | End: 2024-09-03

## 2024-09-03 RX ADMIN — PHENOBARBITAL SODIUM 130 MG: 65 INJECTION INTRAMUSCULAR at 21:10

## 2024-09-03 RX ADMIN — PHENOBARBITAL SODIUM 260 MG: 65 INJECTION INTRAMUSCULAR at 22:54

## 2024-09-03 RX ADMIN — SODIUM CHLORIDE, PRESERVATIVE FREE 10 ML: 5 INJECTION INTRAVENOUS at 22:00

## 2024-09-03 RX ADMIN — ONDANSETRON 4 MG: 2 INJECTION INTRAMUSCULAR; INTRAVENOUS at 22:54

## 2024-09-03 RX ADMIN — ACETAMINOPHEN 650 MG: 325 TABLET ORAL at 23:04

## 2024-09-03 ASSESSMENT — PAIN - FUNCTIONAL ASSESSMENT
PAIN_FUNCTIONAL_ASSESSMENT: NONE - DENIES PAIN
PAIN_FUNCTIONAL_ASSESSMENT: 0-10
PAIN_FUNCTIONAL_ASSESSMENT: NONE - DENIES PAIN

## 2024-09-03 ASSESSMENT — PAIN DESCRIPTION - LOCATION: LOCATION: BACK;KNEE

## 2024-09-03 ASSESSMENT — PAIN SCALES - GENERAL: PAINLEVEL_OUTOF10: 3

## 2024-09-03 NOTE — ED NOTES
Patient resting in bed. Respirations easy and unlabored. No distress noted. Call light within reach.  Significant other at bedside.

## 2024-09-03 NOTE — ED TRIAGE NOTES
Pt presents to ED with alcohol and drug problem. Pt's last drink and use of drugs was right before ED arrival. Pt drinks liquor and twisted teas. Pt reports cocaine and marijuana use. Pt reports pain to knees and back. Pt denies CP, SOB, N/V.

## 2024-09-03 NOTE — ED PROVIDER NOTES
EMERGENCY DEPARTMENT ENCOUNTER     CHIEF COMPLAINT   Chief Complaint   Patient presents with    Alcohol Problem      HPI   Joseph M Litten is a 39 y.o. male who presents alcoholic dependence (many drinks daily), as well as illicit drug use such as cocaine, imbibed earlier today. He was accompanied by his  for detoxification.    REVIEW OF SYSTEMS   Psychiatric: +alcohol and drug abuse; Denies auditory hallucinations or homicidal Ideations   Respiratory:No difficulty breathing or new cough   General:No fevers   Neurologic:No known syncope   GI: No vomiting or abdominal pain   See HPI for further details. All other review of systems otherwise negative.     PAST MEDICAL & SURGICAL HISTORY   Past Medical History:   Diagnosis Date    Alcohol abuse     Chronic allergic rhinitis     Drug abuse (HCC)     Major depressive disorder, single episode, severe with anxious distress (HCC) 12/7/2017      Past Surgical History:   Procedure Laterality Date    ANTERIOR CRUCIATE LIGAMENT REPAIR      FINGER SURGERY  1/10/2015    I and D left index finger    SKIN GRAFT      left ankle    TIBIA FRACTURE SURGERY      WISDOM TOOTH EXTRACTION        CURRENT MEDICATIONS        ALLERGIES   No Known Allergies   SOCIAL & FAMILYHISTORY   Social History     Socioeconomic History    Marital status: Single     Spouse name: None    Number of children: None    Years of education: None    Highest education level: None   Tobacco Use    Smoking status: Never    Smokeless tobacco: Never   Vaping Use    Vaping status: Never Used   Substance and Sexual Activity    Alcohol use: Yes     Comment: Per pt, 5th of a gallon of liquor per day and 12 pack of twisted teas per day    Drug use: Yes     Types: Marijuana (Weed), Cocaine     Comment: 5 grams of cocaine per day per pt    Sexual activity: Yes     Partners: Female     Social Determinants of Health     Financial Resource Strain: Low Risk  (2/2/2022)    Overall Financial Resource Strain (CARDIA)      Difficulty of Paying Living Expenses: Not hard at all   Food Insecurity: No Food Insecurity (2/2/2022)    Hunger Vital Sign     Worried About Running Out of Food in the Last Year: Never true     Ran Out of Food in the Last Year: Never true      Family History   Problem Relation Age of Onset    Sleep Apnea Mother     Other Father 63        car accident    No Known Problems Brother         PHYSICAL EXAM   VITAL SIGNS: /75   Pulse 93   Temp 99.5 °F (37.5 °C) (Oral)   Resp 18   Ht 1.829 m (6')   Wt 72.6 kg (160 lb)   SpO2 92%   BMI 21.70 kg/m²    Constitutional: Well developed, well nourished, mild acute distress   Eyes: PERRL, conjunctiva normal   HENT: Atraumatic, external ears normal, nose normal   Respiratory: No respiratory distress, normal breath sounds   Cardiovascular: Normal rate, normal rhythm, no murmurs   GI: Soft, nondistended, normal bowel sounds, nontender   Musculoskeletal: No edema, no deformities   Integument: Well hydrated   Neurologic: Awake alert and oriented, no slurred speech, +tremors of upper extremities, normal gross motor coordination and strength. Normal Gait. CN 3-12 intatct.   Psychiatric: +anxiety, denies SI/HI, make good eye contact, no signs of overt toxidrome     Labs Reviewed   CBC WITH AUTO DIFFERENTIAL - Abnormal; Notable for the following components:       Result Value    RBC 3.99 (*)     Hemoglobin 13.1 (*)     Hematocrit 39.0 (*)     MCV 97.7 (*)     RDW-SD 46.9 (*)     All other components within normal limits   COMPREHENSIVE METABOLIC PANEL - Abnormal; Notable for the following components:    Glucose 123 (*)     CO2 22 (*)     All other components within normal limits   OSMOLALITY - Abnormal; Notable for the following components:    Osmolality Calc 272.4 (*)     All other components within normal limits   LIPASE   ANION GAP   GLOMERULAR FILTRATION RATE, ESTIMATED   ETHANOL   URINE DRUG SCREEN   SCAN OF BLOOD SMEAR       ED COURSE & MEDICAL DECISION MAKING   This is

## 2024-09-03 NOTE — ED NOTES
Pt attempting to give urine sample. Pt resting in bed with call light in reach. Pt denies further needs.

## 2024-09-04 ENCOUNTER — APPOINTMENT (OUTPATIENT)
Dept: GENERAL RADIOLOGY | Age: 39
DRG: 896 | End: 2024-09-04

## 2024-09-04 ENCOUNTER — APPOINTMENT (OUTPATIENT)
Age: 39
DRG: 896 | End: 2024-09-04
Attending: NURSE PRACTITIONER

## 2024-09-04 ENCOUNTER — APPOINTMENT (OUTPATIENT)
Dept: ULTRASOUND IMAGING | Age: 39
DRG: 896 | End: 2024-09-04

## 2024-09-04 PROBLEM — F10.930 ALCOHOL WITHDRAWAL SYNDROME WITHOUT COMPLICATION (HCC): Status: ACTIVE | Noted: 2024-09-04

## 2024-09-04 PROBLEM — F14.10 COCAINE ABUSE (HCC): Status: ACTIVE | Noted: 2024-09-04

## 2024-09-04 PROBLEM — D53.9 ANEMIA, MACROCYTIC: Status: ACTIVE | Noted: 2024-09-04

## 2024-09-04 PROBLEM — F10.931 ACUTE HYPERACTIVE ALCOHOL WITHDRAWAL DELIRIUM (HCC): Status: RESOLVED | Noted: 2024-09-03 | Resolved: 2024-09-04

## 2024-09-04 PROBLEM — R73.02 IMPAIRED GLUCOSE TOLERANCE: Status: ACTIVE | Noted: 2024-09-04

## 2024-09-04 PROBLEM — G47.33 OSA (OBSTRUCTIVE SLEEP APNEA): Status: ACTIVE | Noted: 2024-09-04

## 2024-09-04 PROBLEM — F10.230 ALCOHOL DEPENDENCE WITH UNCOMPLICATED WITHDRAWAL (HCC): Status: ACTIVE | Noted: 2024-09-04

## 2024-09-04 LAB
ANION GAP SERPL CALC-SCNC: 9 MEQ/L (ref 8–16)
BASOPHILS ABSOLUTE: 0 THOU/MM3 (ref 0–0.1)
BASOPHILS ABSOLUTE: 0 THOU/MM3 (ref 0–0.1)
BASOPHILS NFR BLD AUTO: 0.6 %
BASOPHILS NFR BLD AUTO: 0.7 %
BUN SERPL-MCNC: 11 MG/DL (ref 7–22)
CALCIUM SERPL-MCNC: 8.2 MG/DL (ref 8.5–10.5)
CHLORIDE SERPL-SCNC: 103 MEQ/L (ref 98–111)
CO2 SERPL-SCNC: 27 MEQ/L (ref 23–33)
CREAT SERPL-MCNC: 0.9 MG/DL (ref 0.4–1.2)
CRP SERPL-MCNC: 1.55 MG/DL (ref 0–1)
DEPRECATED MEAN GLUCOSE BLD GHB EST-ACNC: 96 MG/DL (ref 70–126)
DEPRECATED RDW RBC AUTO: 46.9 FL (ref 35–45)
DEPRECATED RDW RBC AUTO: 47.8 FL (ref 35–45)
ECHO AO ASC DIAM: 3.4 CM
ECHO AO ASCENDING AORTA INDEX: 1.72 CM/M2
ECHO AV CUSP MM: 2.2 CM
ECHO AV MEAN GRADIENT: 5 MMHG
ECHO AV MEAN VELOCITY: 1 M/S
ECHO AV PEAK GRADIENT: 8 MMHG
ECHO AV PEAK VELOCITY: 1.4 M/S
ECHO AV VELOCITY RATIO: 0.71
ECHO AV VTI: 26.1 CM
ECHO BSA: 1.92 M2
ECHO EST RA PRESSURE: 8 MMHG
ECHO LA AREA 2C: 16.2 CM2
ECHO LA AREA 4C: 12.5 CM2
ECHO LA DIAMETER INDEX: 1.72 CM/M2
ECHO LA DIAMETER: 3.4 CM
ECHO LA MAJOR AXIS: 4.5 CM
ECHO LA MINOR AXIS: 5.1 CM
ECHO LA VOL BP: 35 ML (ref 18–58)
ECHO LA VOL MOD A2C: 42 ML (ref 18–58)
ECHO LA VOL MOD A4C: 26 ML (ref 18–58)
ECHO LA VOL/BSA BIPLANE: 18 ML/M2 (ref 16–34)
ECHO LA VOLUME INDEX MOD A2C: 21 ML/M2 (ref 16–34)
ECHO LA VOLUME INDEX MOD A4C: 13 ML/M2 (ref 16–34)
ECHO LV E' LATERAL VELOCITY: 21 CM/S
ECHO LV E' SEPTAL VELOCITY: 12 CM/S
ECHO LV EDV A2C: 93 ML
ECHO LV EDV A4C: 115 ML
ECHO LV EDV INDEX A4C: 58 ML/M2
ECHO LV EDV NDEX A2C: 47 ML/M2
ECHO LV EF PHYSICIAN: 55 %
ECHO LV EJECTION FRACTION A2C: 53 %
ECHO LV EJECTION FRACTION A4C: 53 %
ECHO LV EJECTION FRACTION BIPLANE: 53 % (ref 55–100)
ECHO LV ESV A2C: 44 ML
ECHO LV ESV A4C: 54 ML
ECHO LV ESV INDEX A2C: 22 ML/M2
ECHO LV ESV INDEX A4C: 27 ML/M2
ECHO LV FRACTIONAL SHORTENING: 34 % (ref 28–44)
ECHO LV INTERNAL DIMENSION DIASTOLE INDEX: 2.83 CM/M2
ECHO LV INTERNAL DIMENSION DIASTOLIC: 5.6 CM (ref 4.2–5.9)
ECHO LV INTERNAL DIMENSION SYSTOLIC INDEX: 1.87 CM/M2
ECHO LV INTERNAL DIMENSION SYSTOLIC: 3.7 CM
ECHO LV ISOVOLUMETRIC RELAXATION TIME (IVRT): 70 MS
ECHO LV IVSD: 0.8 CM (ref 0.6–1)
ECHO LV MASS 2D: 165 G (ref 88–224)
ECHO LV MASS INDEX 2D: 83.3 G/M2 (ref 49–115)
ECHO LV POSTERIOR WALL DIASTOLIC: 0.8 CM (ref 0.6–1)
ECHO LV RELATIVE WALL THICKNESS RATIO: 0.29
ECHO LVOT AV VTI INDEX: 0.63
ECHO LVOT MEAN GRADIENT: 2 MMHG
ECHO LVOT PEAK GRADIENT: 4 MMHG
ECHO LVOT PEAK VELOCITY: 1 M/S
ECHO LVOT VTI: 16.4 CM
ECHO MV A VELOCITY: 0.58 M/S
ECHO MV E DECELERATION TIME (DT): 115 MS
ECHO MV E VELOCITY: 0.81 M/S
ECHO MV E/A RATIO: 1.4
ECHO MV E/E' LATERAL: 3.86
ECHO MV E/E' RATIO (AVERAGED): 5.3
ECHO MV E/E' SEPTAL: 6.75
ECHO PULMONARY ARTERY END DIASTOLIC PRESSURE: 5 MMHG
ECHO PV MAX VELOCITY: 0.8 M/S
ECHO PV PEAK GRADIENT: 2 MMHG
ECHO PV REGURGITANT MAX VELOCITY: 1.2 M/S
ECHO RIGHT VENTRICULAR SYSTOLIC PRESSURE (RVSP): 34 MMHG
ECHO RV INTERNAL DIMENSION: 2.9 CM
ECHO RV TAPSE: 1.9 CM (ref 1.7–?)
ECHO TV E WAVE: 1.1 M/S
ECHO TV REGURGITANT MAX VELOCITY: 2.56 M/S
ECHO TV REGURGITANT PEAK GRADIENT: 26 MMHG
EOSINOPHIL NFR BLD AUTO: 1.6 %
EOSINOPHIL NFR BLD AUTO: 2.6 %
EOSINOPHILS ABSOLUTE: 0.1 THOU/MM3 (ref 0–0.4)
EOSINOPHILS ABSOLUTE: 0.1 THOU/MM3 (ref 0–0.4)
ERYTHROCYTE [DISTWIDTH] IN BLOOD BY AUTOMATED COUNT: 13 % (ref 11.5–14.5)
ERYTHROCYTE [DISTWIDTH] IN BLOOD BY AUTOMATED COUNT: 13.2 % (ref 11.5–14.5)
FERRITIN SERPL IA-MCNC: 208 NG/ML (ref 22–322)
FLUAV RNA RESP QL NAA+PROBE: NOT DETECTED
FLUBV RNA RESP QL NAA+PROBE: NOT DETECTED
FOLATE SERPL-MCNC: 4.2 NG/ML (ref 4.8–24.2)
GFR SERPL CREATININE-BSD FRML MDRD: > 90 ML/MIN/1.73M2
GLUCOSE SERPL-MCNC: 116 MG/DL (ref 70–108)
HBA1C MFR BLD HPLC: 5.2 % (ref 4.4–6.4)
HCT VFR BLD AUTO: 39 % (ref 42–52)
HCT VFR BLD AUTO: 39.1 % (ref 42–52)
HGB BLD-MCNC: 13.1 GM/DL (ref 14–18)
HGB BLD-MCNC: 13.1 GM/DL (ref 14–18)
IMM GRANULOCYTES # BLD AUTO: 0.01 THOU/MM3 (ref 0–0.07)
IMM GRANULOCYTES # BLD AUTO: 0.01 THOU/MM3 (ref 0–0.07)
IMM GRANULOCYTES NFR BLD AUTO: 0.2 %
IMM GRANULOCYTES NFR BLD AUTO: 0.2 %
IRON SATN MFR SERPL: 27 % (ref 20–50)
IRON SERPL-MCNC: 51 UG/DL (ref 65–195)
LACTATE SERPL-SCNC: 1.5 MMOL/L (ref 0.5–2)
LYMPHOCYTES ABSOLUTE: 2.1 THOU/MM3 (ref 1–4.8)
LYMPHOCYTES ABSOLUTE: 2.1 THOU/MM3 (ref 1–4.8)
LYMPHOCYTES NFR BLD AUTO: 43.5 %
LYMPHOCYTES NFR BLD AUTO: 50.8 %
MAGNESIUM SERPL-MCNC: 2.2 MG/DL (ref 1.6–2.4)
MCH RBC QN AUTO: 32.8 PG (ref 26–33)
MCH RBC QN AUTO: 33.2 PG (ref 26–33)
MCHC RBC AUTO-ENTMCNC: 33.5 GM/DL (ref 32.2–35.5)
MCHC RBC AUTO-ENTMCNC: 33.6 GM/DL (ref 32.2–35.5)
MCV RBC AUTO: 97.7 FL (ref 80–94)
MCV RBC AUTO: 99.2 FL (ref 80–94)
MONOCYTES ABSOLUTE: 0.2 THOU/MM3 (ref 0.4–1.3)
MONOCYTES ABSOLUTE: 0.2 THOU/MM3 (ref 0.4–1.3)
MONOCYTES NFR BLD AUTO: 4.9 %
MONOCYTES NFR BLD AUTO: 5.8 %
NEUTROPHILS ABSOLUTE: 1.7 THOU/MM3 (ref 1.8–7.7)
NEUTROPHILS ABSOLUTE: 2.4 THOU/MM3 (ref 1.8–7.7)
NEUTROPHILS NFR BLD AUTO: 39.9 %
NEUTROPHILS NFR BLD AUTO: 49.2 %
NRBC BLD AUTO-RTO: 0 /100 WBC
NRBC BLD AUTO-RTO: 0 /100 WBC
PATHOLOGIST REVIEW: ABNORMAL
PLATELET # BLD AUTO: 165 THOU/MM3 (ref 130–400)
PLATELET # BLD AUTO: 177 THOU/MM3 (ref 130–400)
PLATELET BLD QL SMEAR: ADEQUATE
PMV BLD AUTO: 10 FL (ref 9.4–12.4)
PMV BLD AUTO: 10.6 FL (ref 9.4–12.4)
POTASSIUM SERPL-SCNC: 4.1 MEQ/L (ref 3.5–5.2)
PROCALCITONIN SERPL IA-MCNC: 0.42 NG/ML (ref 0.01–0.09)
RBC # BLD AUTO: 3.94 MILL/MM3 (ref 4.7–6.1)
RBC # BLD AUTO: 3.99 MILL/MM3 (ref 4.7–6.1)
SARS-COV-2 RNA RESP QL NAA+PROBE: NOT DETECTED
SCAN OF BLOOD SMEAR: NORMAL
SODIUM SERPL-SCNC: 139 MEQ/L (ref 135–145)
T4 FREE SERPL-MCNC: 0.82 NG/DL (ref 0.93–1.68)
TIBC SERPL-MCNC: 192 UG/DL (ref 171–450)
TOXIC GRANULES BLD QL SMEAR: PRESENT
TSH SERPL DL<=0.005 MIU/L-ACNC: 0.33 UIU/ML (ref 0.4–4.2)
VARIANT LYMPHS BLD QL SMEAR: ABNORMAL %
VARIANT LYMPHS BLD QL SMEAR: ABNORMAL %
VIT B12 SERPL-MCNC: 187 PG/ML (ref 211–911)
WBC # BLD AUTO: 4.2 THOU/MM3 (ref 4.8–10.8)
WBC # BLD AUTO: 4.9 THOU/MM3 (ref 4.8–10.8)

## 2024-09-04 PROCEDURE — 36415 COLL VENOUS BLD VENIPUNCTURE: CPT

## 2024-09-04 PROCEDURE — 83036 HEMOGLOBIN GLYCOSYLATED A1C: CPT

## 2024-09-04 PROCEDURE — 1200000003 HC TELEMETRY R&B

## 2024-09-04 PROCEDURE — 83605 ASSAY OF LACTIC ACID: CPT

## 2024-09-04 PROCEDURE — 85025 COMPLETE CBC W/AUTO DIFF WBC: CPT

## 2024-09-04 PROCEDURE — 2580000003 HC RX 258: Performed by: NURSE PRACTITIONER

## 2024-09-04 PROCEDURE — 6360000002 HC RX W HCPCS: Performed by: NURSE PRACTITIONER

## 2024-09-04 PROCEDURE — 83540 ASSAY OF IRON: CPT

## 2024-09-04 PROCEDURE — 99233 SBSQ HOSP IP/OBS HIGH 50: CPT | Performed by: PHYSICIAN ASSISTANT

## 2024-09-04 PROCEDURE — 83550 IRON BINDING TEST: CPT

## 2024-09-04 PROCEDURE — 86140 C-REACTIVE PROTEIN: CPT

## 2024-09-04 PROCEDURE — 80048 BASIC METABOLIC PNL TOTAL CA: CPT

## 2024-09-04 PROCEDURE — 93306 TTE W/DOPPLER COMPLETE: CPT | Performed by: NUCLEAR MEDICINE

## 2024-09-04 PROCEDURE — 82728 ASSAY OF FERRITIN: CPT

## 2024-09-04 PROCEDURE — 76770 US EXAM ABDO BACK WALL COMP: CPT

## 2024-09-04 PROCEDURE — 82607 VITAMIN B-12: CPT

## 2024-09-04 PROCEDURE — 82746 ASSAY OF FOLIC ACID SERUM: CPT

## 2024-09-04 PROCEDURE — 83735 ASSAY OF MAGNESIUM: CPT

## 2024-09-04 PROCEDURE — 93306 TTE W/DOPPLER COMPLETE: CPT

## 2024-09-04 PROCEDURE — 6370000000 HC RX 637 (ALT 250 FOR IP): Performed by: NURSE PRACTITIONER

## 2024-09-04 PROCEDURE — 71045 X-RAY EXAM CHEST 1 VIEW: CPT

## 2024-09-04 PROCEDURE — 87040 BLOOD CULTURE FOR BACTERIA: CPT

## 2024-09-04 PROCEDURE — 87636 SARSCOV2 & INF A&B AMP PRB: CPT

## 2024-09-04 PROCEDURE — 87086 URINE CULTURE/COLONY COUNT: CPT

## 2024-09-04 PROCEDURE — 84145 PROCALCITONIN (PCT): CPT

## 2024-09-04 PROCEDURE — 84439 ASSAY OF FREE THYROXINE: CPT

## 2024-09-04 PROCEDURE — 84481 FREE ASSAY (FT-3): CPT

## 2024-09-04 PROCEDURE — 84443 ASSAY THYROID STIM HORMONE: CPT

## 2024-09-04 RX ORDER — SODIUM CHLORIDE, SODIUM LACTATE, POTASSIUM CHLORIDE, CALCIUM CHLORIDE 600; 310; 30; 20 MG/100ML; MG/100ML; MG/100ML; MG/100ML
INJECTION, SOLUTION INTRAVENOUS CONTINUOUS
Status: ACTIVE | OUTPATIENT
Start: 2024-09-04 | End: 2024-09-04

## 2024-09-04 RX ORDER — LEVOTHYROXINE SODIUM 50 UG/1
50 TABLET ORAL DAILY
Status: DISCONTINUED | OUTPATIENT
Start: 2024-09-04 | End: 2024-09-06 | Stop reason: HOSPADM

## 2024-09-04 RX ADMIN — PHENOBARBITAL SODIUM 260 MG: 65 INJECTION INTRAMUSCULAR at 09:39

## 2024-09-04 RX ADMIN — ACETAMINOPHEN 650 MG: 325 TABLET ORAL at 23:07

## 2024-09-04 RX ADMIN — FOLIC ACID 1 MG: 1 TABLET ORAL at 08:22

## 2024-09-04 RX ADMIN — CEFTRIAXONE SODIUM 1000 MG: 1 INJECTION, POWDER, FOR SOLUTION INTRAMUSCULAR; INTRAVENOUS at 06:28

## 2024-09-04 RX ADMIN — SODIUM CHLORIDE, POTASSIUM CHLORIDE, SODIUM LACTATE AND CALCIUM CHLORIDE: 600; 310; 30; 20 INJECTION, SOLUTION INTRAVENOUS at 06:26

## 2024-09-04 RX ADMIN — Medication 1 TABLET: at 08:22

## 2024-09-04 RX ADMIN — Medication 100 MG: at 08:22

## 2024-09-04 RX ADMIN — ACETAMINOPHEN 650 MG: 325 TABLET ORAL at 12:52

## 2024-09-04 ASSESSMENT — PAIN SCALES - GENERAL
PAINLEVEL_OUTOF10: 0
PAINLEVEL_OUTOF10: 0

## 2024-09-04 NOTE — ED NOTES
Patient resting in bed. Respirations easy and unlabored. No distress noted. Call light within reach.  Girlfriend at bedside. Pheno administered.

## 2024-09-04 NOTE — CARE COORDINATION
Case Management Assessment Initial Evaluation    Date/Time of Evaluation: 2024 7:47 AM  Assessment Completed by: Alexandra Loredo RN    If patient is discharged prior to next notation, then this note serves as note for discharge by case management.    Patient Name: Joseph M Litten                   YOB: 1985  Diagnosis: Alcohol dependence with uncomplicated withdrawal (HCC) [F10.230]  Acute hyperactive alcohol withdrawal delirium (HCC) [F10.931]                   Date / Time: 9/3/2024  5:27 PM  [facility]     Patient Admission Status: Inpatient   Readmission Risk Low 0-14, Mod 15-19), High > 20: Readmission Risk Score: 7.4    Current PCP: Jh Fried APRN - CNP  Health Care Decision Makers:     Additional Case Management Notes: To ED seeking assistance to stop alcohol and cocaine use. Patient identifies drinking 12 beers daily for at least 15 to 20 years. His last alcohol use was 1500 on day of admission. Patient identifies daily cocaine use for the last 5 to 10 years with its last use day of admission.     Hospitalist following. Addiction Services. Telemetry. IVF stopped. IV rocephin q24h. Lovenox. Folic Acid. Multivitamin. Phenobarbital per ETOH protocol. Thiamine.   Procedures:    ECHO: pending      Imagin/4 CXR: New coarsened appearance and interstitial opacities in the lung  bases. This could resent early pneumonia versus mild pulmonary edema.    US Renal: Normal bilateral renal ultrasound. 2. Unremarkable urinary bladder.     Patient Goals/Plan/Treatment Preferences: Attempt to meet w/ Lj on 4 occasions and unsuccessful. At latest attempt, Lj was difficult to arouse and disoriented x4 after arousal. Spoke w/ his girlfriend Caty at bedside.     Per Caty, they live together in a farm home. Lj is very independent and actively drives. Has a cpap at home that he doesn't use. Caty states Lj has voiced wanting to quit drinking and doing drugs and is willing to go  to an acute rehabilitation/detox center. She says she's afraid he will not follow through if discharged home and surrounded by his current friend group. Caty also states she does have a slight drinking problem and will go rehabilitation with Lj if it will help with his addiction recovery.     Discussed patient being over-financed for Medicaid, so they would need to private pay at rehabilitation center. Provided with addiction resources and outlined centers with private pay option. Explained to Caty that they will need to call around and determine where Lj (and potentially her) would like to go and if the centers have financial assistance or payment plan options. Informed Caty that Lj will need to complete the intake process at facility of their choice and once accepted CM/SW can send his clinicals and assist with transfer process. Provided with CM office contact information.      left for Patsy with prescription assistance to screen for [facility].      09/04/24 0805   Service Assessment   Patient Orientation Other (see comment)  (Disoriented, on phenobarbital)   Cognition Other (see comment)  (ALANA- on phenobarbital, hard to arouse)   History Provided By Significant Other   Primary Caregiver Self   Accompanied By/Relationship girlfriend Caty   Support Systems Spouse/Significant Other;Parent   Patient's Healthcare Decision Maker is: Patient Declined (Legal Next of Kin Remains as Decision Maker)   PCP Verified by CM Yes   Last Visit to PCP Within last two years  (More than 2 yrs, prefers to remain with Roni Fried)   Prior Functional Level Independent in ADLs/IADLs   Current Functional Level Independent in ADLs/IADLs   Can patient return to prior living arrangement Yes   Ability to make needs known: Poor  (at this time)   Family able to assist with home care needs: Yes   Would you like for me to discuss the discharge plan with any other family members/significant others, and if so, who?

## 2024-09-04 NOTE — PROGRESS NOTES
AOD SBIRT attempt. Patient currently sleeping at this time. Gained collateral from family with patient; patient is wanting a longer-term detox facility. DESTIN SW to reattempt tomorrow when patient is feeling better

## 2024-09-04 NOTE — CARE COORDINATION
9/4/24, 9:18 AM EDT    DISCHARGE PLANNING EVALUATION     SW consult deferred Patient will be seen by addiction services. SW available to questions as needed, no other needs at this time.

## 2024-09-04 NOTE — H&P
Hospitalist  History and Physical    Patient:  Joseph M Litten  MRN: [id number]    CHIEF COMPLAINT: alcohol withdrawal    History Obtained From:  patient, electronic medical record  PCP: Jh Fried APRN - CNP    HISTORY OF PRESENT ILLNESS:   Joseph Litten is a 39 year old male presented to [facility] 9/3/24 with chief complaint of alcohol withdrawal.     Patient has a past medical history significant for lifetime non-smoker, ARISTEO-CPAP, anxiety, allergic rhinitis, AUD, CUD.    Patient presents to [facility] seeking assistance to stop alcohol and cocaine use.  Patient identifies drinking 12 beers daily for at least 15 to 20 years.  His last alcohol use was 1500 on day of admission.  Patient identifies daily cocaine use for the last 5 to 10 years with its last use day of admission.  Patient has never gone through withdrawal or a rehab program before in the past.  Patient denies any auditory or visual hallucinations.  Patient did receive phenobarbital while in the ER.      Past Medical History:        Diagnosis Date    Alcohol abuse     Chronic allergic rhinitis     Drug abuse (HCC)     Major depressive disorder, single episode, severe with anxious distress (HCC) 12/7/2017       Past Surgical History:        Procedure Laterality Date    ANTERIOR CRUCIATE LIGAMENT REPAIR      FINGER SURGERY  1/10/2015    I and D left index finger    SKIN GRAFT      left ankle    TIBIA FRACTURE SURGERY      WISDOM TOOTH EXTRACTION         Medications Prior to Admission:    Prior to Admission medications    Medication Sig Start Date End Date Taking? Authorizing Provider   ibuprofen (ADVIL;MOTRIN) 800 MG tablet Take 1 tablet by mouth every 8 hours as needed for Pain 4/7/24   Dimple Choi PA-C   naproxen (NAPROSYN) 500 MG tablet Take 1 tablet by mouth 2 times daily for 7 days 10/15/22 10/22/22  Martinez Gutierrez, DO   CPAP Machine MISC by Does not apply route Please change APAP to CPAP pressure of 6 cm H20.  Patient not taking: Reported on  canals and external ears. Non-tender.  Nose: Normal external nose, mucus membranes and septum.  Pharynx: Dental Hygiene adequate. Normal buccal mucosa. Normal pharynx.  Neck / Thyroid: Supple, no masses, nodes, nodules or enlargement.  Neck: no adenopathy, no carotid bruit, no JVD, supple, symmetrical, trachea midline, and thyroid not enlarged, symmetric, no tenderness/mass/nodules  Lungs: clear to auscultation bilaterally  Heart: regular rate and rhythm, S1, S2 normal, no murmur, click, rub or gallop  Abdomen: soft, non-tender; bowel sounds normal; no masses,  no organomegaly  Extremities: extremities normal, atraumatic, no cyanosis or edema  Neurologic: Mental status: Alert, oriented, thought content appropriate    CBC:   Recent Labs     24  1745   WBC 4.9   HGB 13.1*        BMP:    Recent Labs     24  1745      K 4.1      CO2 22*   BUN 10   CREATININE 0.9   GLUCOSE 123*     Hepatic:   Recent Labs     24  1745   AST 28   ALT 22   BILITOT 0.3   ALKPHOS 79     Troponin: No results for input(s): \"TROPONINI\" in the last 72 hours.  BNP: No results for input(s): \"BNP\" in the last 72 hours.  Lipids: No results for input(s): \"CHOL\", \"HDL\" in the last 72 hours.    Invalid input(s): \"LDLCALCU\"  ABGs: No results found for: \"PHART\", \"PO2ART\", \"LHK6XVG\"  INR: No results for input(s): \"INR\" in the last 72 hours.  -----------------------------------------------------------------  EK/3/2024 sinus tachycardia heart rate 123  QTc 483    Assessment and Plan   Alcohol withdrawal syndrome, uncomplicated: AUD daily ETOH-Beer use. Patient has never gone thru withdrawal or rehab before. PAWSS=4. ETOH <0.01. Phenobarbital was initiated and will be continued at time of admission.  MVI, folic acid, thiamine will be continued.  Addiction services and social work have been consulted to assist with resources.  ARISTEO-CPAP: ok to use home unit.   Impaired glucose tolerance: HgbA1c is pending.

## 2024-09-04 NOTE — PLAN OF CARE
Problem: Safety - Adult  Goal: Free from fall injury  Outcome: Progressing  Flowsheets (Taken 9/4/2024 0224)  Free From Fall Injury: Instruct family/caregiver on patient safety  Note: Patient remains free from fall this shift. Bed alarm activated . Bed locked and lowest position.  2/4 side rails raised for safety.  Patient has non-skid socks when ambulating. Pathway clear and possessions in reach. Call light within reach. Patient rounded on hourly.        Problem: Metabolic/Fluid and Electrolytes - Adult  Goal: Electrolytes maintained within normal limits  Outcome: Progressing  Flowsheets (Taken 9/4/2024 0224)  Electrolytes maintained within normal limits:   Monitor labs and assess patient for signs and symptoms of electrolyte imbalances   Administer electrolyte replacement as ordered   Monitor response to electrolyte replacements, including repeat lab results as appropriate       Problem: Gastrointestinal - Adult  Goal: Minimal or absence of nausea and vomiting  Outcome: Progressing  Flowsheets (Taken 9/4/2024 0224)  Minimal or absence of nausea and vomiting:   Administer IV fluids as ordered to ensure adequate hydration   Maintain NPO status until nausea and vomiting are resolved   Administer ordered antiemetic medications as needed   Provide nonpharmacologic comfort measures as appropriate   Advance diet as tolerated, if ordered       Problem: Discharge Planning  Goal: Discharge to home or other facility with appropriate resources  Outcome: Progressing  Flowsheets (Taken 9/4/2024 0224)  Discharge to home or other facility with appropriate resources:   Identify barriers to discharge with patient and caregiver   Arrange for needed discharge resources and transportation as appropriate   Identify discharge learning needs (meds, wound care, etc)   Refer to discharge planning if patient needs post-hospital services based on physician order or complex needs related to functional status, cognitive ability or social

## 2024-09-04 NOTE — PROGRESS NOTES
Hospitalist Progress Note      Patient:  Joseph M Litten 39 y.o. male       : 1985  Unit/Bed:-28/028-A    Date of Admission: 9/3/2024      ASSESSMENT AND PLAN    Active Problems  Acute alcohol withdrawal  Phenobarb PRN protocol.   Thiamine, folic acid, MTV.   Seizure precautions. Addiction services   Acute metabolic encephalopathy  Secondary to withdrawal. No obvious FND; however, pt not cooperative with full exam.   If no improvement, can consider CTH, consider high dose thiamine.   Cocaine abuse, with acute withdrawal  Reports heavy daily use. Suspect withdrawal contributing to hypersomnia, agitation.   Hypothyroidism  Low TSH, low FT4; concern for central hypothyroidism.   Will start Synthroid.   Does not require inpt workup, will need OP repeat labs and endocrine referral.   Fever  Likely secondary to withdrawal.   Infectious workup negative thus far. No leukocytosis. UA pending. Blood cultures pending.   Folic acid and thiamine deficiency, with mild anemia  Add PO supplementation. CBC daily.   Heart murmur  Echo pending.     Resolved Problems      Chronic Conditions (reviewed, stable, and home medications resumed, unless otherwise stated)  Polysubstance abuse  Anxiety/depression  ARISTEO      LDA: []CVC / []PICC / []Midline / []Zuleta / []Drains / []Mediport / [x]None  Antibiotics: no  Steroids: no  Labs (still needed?): [x]Yes / []No  IVF (still needed?): []Yes / [x]No    Level of care: []Step Down / [x]Med-Surg  Bed Status: [x]Inpatient / []Observation  Telemetry: [x]Yes / []No  PT/OT: []Yes / [x]No    DVT Prophylaxis: [x] Lovenox / [] Heparin / [] SCDs / [] Already on Systemic Anticoagulation / [] None     Expected discharge date:  3 days  Disposition: Home     Code status: Full Code     ===================================================================    Chief Complaint: alcohol withdrawal  Subjective (past 24 hours):   : Patient resting comfortably. When woken up, pt states \"get this bitch

## 2024-09-04 NOTE — PLAN OF CARE
Problem: Discharge Planning  Goal: Discharge to home or other facility with appropriate resources  9/4/2024 0919 by Bridget Fay, MSW, LSW  Outcome: Progressing  Flowsheets (Taken 9/4/2024 0919)  Discharge to home or other facility with appropriate resources: Identify barriers to discharge with patient and caregiver  Note: SW consult deferred, addictions to see, see SW notes 9/4/24.

## 2024-09-04 NOTE — ED NOTES
Pt transported to k28 on cart in stable condition. Floor contacted and spoke with Zehra prior to transport.

## 2024-09-04 NOTE — ED NOTES
Utilize [facility] alcohol withdrawal scale (Based on Shirley Modified Alcohol Withdrawal Scale).  Tabulate score based on classifications including Tremor, Sweating, Hallucination, Orientation, and Agitation.    Tremor: 0  Sweatin  Hallucinations: 1  Orientation: 0  Agitation: 1  Total Score: 3  Action perform as described below     Tremor:  No tremor is 0 points.  Tremor on movement is 1 point.  Tremor at rest is 2 points.  Sweating: No Sweat 0 points. Moist is 1 point.  Drenching sweats is 2 points.  Hallucinations: No present 0 points. Dissuadable is 1 point. Not dissuadable is 2 points.  Orientation: Oriented 0 points. Vague/detached 1 point. Disoriented/no contact 2 points.  Agitation: Calm 0 points.  Anxious 1 point. Panicky 2 points.    Check scale every 2 hours.  Discontinue scoring with 4 consecutive scorings of 0.  Scale 0: No phenobarbital given.  Re-assess every 60 minutes as needed.   Scale 1-3: Phenobarbital 130 mg IV over 3 minutes. Re-assess every 60 minutes as needed.  May administer every 60 minutes to a maximum dose of phenobarbital 1040 mg in 24 hours!  Scale 4-8: Phenobarbital 260 mg IV over 5 minutes.  Re-assess every 60 minutes as needed. May administer every 60 minutes to a maximum dose of phenobarbital 1040mg in 24 hours!  Scale 9-10: Transfer to ICU (if not already in ICU).  Administer 10mg/kg phenobarbital IV over 60 minutes.  Maximum dose phenobarbital is 1040mg in 24 hours!

## 2024-09-04 NOTE — ED NOTES
Patient resting in bed. Respirations easy and unlabored. No distress noted. Call light within reach.  Denies needs

## 2024-09-04 NOTE — ED NOTES
ED to inpatient nurses report      Chief Complaint:  Chief Complaint   Patient presents with    Alcohol Problem     Present to ED from: Home    MOA:     LOC: alert and orientated to name, place, date  Mobility: Fully dependent  Oxygen Baseline: RA    Current needs required: 0     Code Status:   Full Code    What abnormal results were found and what did you give/do to treat them? Alcohol dependence, uncomplicated withdrawal; labs, medications  Any procedures or intervention occur? NA    Mental Status:  Level of Consciousness: Alert (0)    Psych Assessment:        Vitals:  Patient Vitals for the past 24 hrs:   BP Temp Pulse Resp SpO2 Height Weight   09/03/24 2054 115/66 -- 88 21 96 % -- --   09/03/24 1954 132/73 -- 94 29 96 % -- --   09/03/24 1855 124/60 -- (!) 104 20 95 % -- --   09/03/24 1807 102/60 -- (!) 112 17 96 % -- --   09/03/24 1739 110/66 99.4 °F (37.4 °C) (!) 122 16 96 % 1.829 m (6') 72.6 kg (160 lb)        LDAs:   Peripheral IV 09/03/24 Left Antecubital (Active)   Site Assessment Clean, dry & intact 09/03/24 1958   Line Status Normal saline locked 09/03/24 1958   Phlebitis Assessment No symptoms 09/03/24 1958   Infiltration Assessment 0 09/03/24 1958   Dressing Status Clean, dry & intact 09/03/24 1958   Dressing Type Transparent 09/03/24 1958       Ambulatory Status:  Presents to emergency department  because of falls (Syncope, seizure, or loss of consciousness): No, Age > 70: No, Altered Mental Status, Intoxication with alcohol or substance confusion (Disorientation, impaired judgment, poor safety awaremess, or inability to follow instructions): Yes, Impaired Mobility: Ambulates or transfers with assistive devices or assistance; Unable to ambulate or transer.: No, Nursing Judgement: No    Diagnosis:  DISPOSITION Admitted 09/03/2024 08:41:32 PM   Final diagnoses:   Alcohol dependence with uncomplicated withdrawal (HCC)        Consults:  IP CONSULT TO SOCIAL WORK  IP CONSULT TO ADDICTION SERVICES     Pain

## 2024-09-05 LAB
ALBUMIN SERPL BCG-MCNC: 3.3 G/DL (ref 3.5–5.1)
ALP SERPL-CCNC: 117 U/L (ref 38–126)
ALT SERPL W/O P-5'-P-CCNC: 89 U/L (ref 11–66)
ANION GAP SERPL CALC-SCNC: 7 MEQ/L (ref 8–16)
AST SERPL-CCNC: 126 U/L (ref 5–40)
BILIRUB SERPL-MCNC: 0.3 MG/DL (ref 0.3–1.2)
BUN SERPL-MCNC: 12 MG/DL (ref 7–22)
CALCIUM SERPL-MCNC: 8.2 MG/DL (ref 8.5–10.5)
CHLORIDE SERPL-SCNC: 101 MEQ/L (ref 98–111)
CO2 SERPL-SCNC: 25 MEQ/L (ref 23–33)
CREAT SERPL-MCNC: 0.7 MG/DL (ref 0.4–1.2)
DEPRECATED RDW RBC AUTO: 51.2 FL (ref 35–45)
ERYTHROCYTE [DISTWIDTH] IN BLOOD BY AUTOMATED COUNT: 13.5 % (ref 11.5–14.5)
GFR SERPL CREATININE-BSD FRML MDRD: > 90 ML/MIN/1.73M2
GLUCOSE SERPL-MCNC: 120 MG/DL (ref 70–108)
HCT VFR BLD AUTO: 40.2 % (ref 42–52)
HGB BLD-MCNC: 12.9 GM/DL (ref 14–18)
MCH RBC QN AUTO: 32.5 PG (ref 26–33)
MCHC RBC AUTO-ENTMCNC: 32.1 GM/DL (ref 32.2–35.5)
MCV RBC AUTO: 101.3 FL (ref 80–94)
PLATELET # BLD AUTO: 151 THOU/MM3 (ref 130–400)
PMV BLD AUTO: 10.2 FL (ref 9.4–12.4)
POTASSIUM SERPL-SCNC: 4.4 MEQ/L (ref 3.5–5.2)
PROT SERPL-MCNC: 5.7 G/DL (ref 6.1–8)
RBC # BLD AUTO: 3.97 MILL/MM3 (ref 4.7–6.1)
SODIUM SERPL-SCNC: 133 MEQ/L (ref 135–145)
WBC # BLD AUTO: 6.5 THOU/MM3 (ref 4.8–10.8)

## 2024-09-05 PROCEDURE — 6360000002 HC RX W HCPCS: Performed by: NURSE PRACTITIONER

## 2024-09-05 PROCEDURE — 6370000000 HC RX 637 (ALT 250 FOR IP): Performed by: PHYSICIAN ASSISTANT

## 2024-09-05 PROCEDURE — 36415 COLL VENOUS BLD VENIPUNCTURE: CPT

## 2024-09-05 PROCEDURE — 1200000003 HC TELEMETRY R&B

## 2024-09-05 PROCEDURE — 6370000000 HC RX 637 (ALT 250 FOR IP): Performed by: NURSE PRACTITIONER

## 2024-09-05 PROCEDURE — 85027 COMPLETE CBC AUTOMATED: CPT

## 2024-09-05 PROCEDURE — 80053 COMPREHEN METABOLIC PANEL: CPT

## 2024-09-05 PROCEDURE — 99233 SBSQ HOSP IP/OBS HIGH 50: CPT | Performed by: PHYSICIAN ASSISTANT

## 2024-09-05 PROCEDURE — 2580000003 HC RX 258: Performed by: NURSE PRACTITIONER

## 2024-09-05 RX ORDER — POLYETHYLENE GLYCOL 3350 17 G
2 POWDER IN PACKET (EA) ORAL
Status: DISCONTINUED | OUTPATIENT
Start: 2024-09-05 | End: 2024-09-06 | Stop reason: HOSPADM

## 2024-09-05 RX ORDER — NICOTINE 21 MG/24HR
1 PATCH, TRANSDERMAL 24 HOURS TRANSDERMAL EVERY 24 HOURS
Status: DISCONTINUED | OUTPATIENT
Start: 2024-09-05 | End: 2024-09-06 | Stop reason: HOSPADM

## 2024-09-05 RX ADMIN — FOLIC ACID 1 MG: 1 TABLET ORAL at 08:13

## 2024-09-05 RX ADMIN — NICOTINE POLACRILEX 2 MG: 2 LOZENGE ORAL at 19:46

## 2024-09-05 RX ADMIN — PHENOBARBITAL SODIUM 260 MG: 65 INJECTION INTRAMUSCULAR at 12:02

## 2024-09-05 RX ADMIN — ENOXAPARIN SODIUM 40 MG: 100 INJECTION SUBCUTANEOUS at 08:13

## 2024-09-05 RX ADMIN — Medication 100 MG: at 08:13

## 2024-09-05 RX ADMIN — PHENOBARBITAL SODIUM 260 MG: 65 INJECTION INTRAMUSCULAR at 08:13

## 2024-09-05 RX ADMIN — PHENOBARBITAL SODIUM 260 MG: 65 INJECTION INTRAMUSCULAR at 19:55

## 2024-09-05 RX ADMIN — Medication 1 TABLET: at 08:13

## 2024-09-05 RX ADMIN — LEVOTHYROXINE SODIUM 50 MCG: 0.05 TABLET ORAL at 06:24

## 2024-09-05 RX ADMIN — ACETAMINOPHEN 650 MG: 325 TABLET ORAL at 20:02

## 2024-09-05 RX ADMIN — SODIUM CHLORIDE, PRESERVATIVE FREE 10 ML: 5 INJECTION INTRAVENOUS at 19:55

## 2024-09-05 ASSESSMENT — PATIENT HEALTH QUESTIONNAIRE - PHQ9
10. IF YOU CHECKED OFF ANY PROBLEMS, HOW DIFFICULT HAVE THESE PROBLEMS MADE IT FOR YOU TO DO YOUR WORK, TAKE CARE OF THINGS AT HOME, OR GET ALONG WITH OTHER PEOPLE: EXTREMELY DIFFICULT
8. MOVING OR SPEAKING SO SLOWLY THAT OTHER PEOPLE COULD HAVE NOTICED. OR THE OPPOSITE, BEING SO FIGETY OR RESTLESS THAT YOU HAVE BEEN MOVING AROUND A LOT MORE THAN USUAL: NEARLY EVERY DAY
SUM OF ALL RESPONSES TO PHQ QUESTIONS 1-9: 24
SUM OF ALL RESPONSES TO PHQ9 QUESTIONS 1 & 2: 6
7. TROUBLE CONCENTRATING ON THINGS, SUCH AS READING THE NEWSPAPER OR WATCHING TELEVISION: MORE THAN HALF THE DAYS
SUM OF ALL RESPONSES TO PHQ QUESTIONS 1-9: 24
4. FEELING TIRED OR HAVING LITTLE ENERGY: MORE THAN HALF THE DAYS
SUM OF ALL RESPONSES TO PHQ QUESTIONS 1-9: 21
9. THOUGHTS THAT YOU WOULD BE BETTER OFF DEAD, OR OF HURTING YOURSELF: NEARLY EVERY DAY
5. POOR APPETITE OR OVEREATING: MORE THAN HALF THE DAYS
2. FEELING DOWN, DEPRESSED OR HOPELESS: NEARLY EVERY DAY
6. FEELING BAD ABOUT YOURSELF - OR THAT YOU ARE A FAILURE OR HAVE LET YOURSELF OR YOUR FAMILY DOWN: NEARLY EVERY DAY
1. LITTLE INTEREST OR PLEASURE IN DOING THINGS: NEARLY EVERY DAY
3. TROUBLE FALLING OR STAYING ASLEEP: NEARLY EVERY DAY
SUM OF ALL RESPONSES TO PHQ QUESTIONS 1-9: 24

## 2024-09-05 ASSESSMENT — LIFESTYLE VARIABLES
HOW MANY STANDARD DRINKS CONTAINING ALCOHOL DO YOU HAVE ON A TYPICAL DAY: 10 OR MORE
HOW OFTEN DO YOU HAVE A DRINK CONTAINING ALCOHOL: 4 OR MORE TIMES A WEEK

## 2024-09-05 ASSESSMENT — PAIN SCALES - GENERAL: PAINLEVEL_OUTOF10: 0

## 2024-09-05 NOTE — PROGRESS NOTES
Attempted outreach to screen for [facility], unable to make contact. Will try back as time allows. Per RN Zana, patient not expected to discharge today.  Patsy [facility]ator[facility] - Prescription Assistance ([phone]) 9/5/2024,2:05 PM

## 2024-09-05 NOTE — PLAN OF CARE
Problem: Discharge Planning  Goal: Discharge to home or other facility with appropriate resources  Outcome: Progressing  Flowsheets (Taken 9/5/2024 1019)  Discharge to home or other facility with appropriate resources: Identify barriers to discharge with patient and caregiver     Problem: Gastrointestinal - Adult  Goal: Minimal or absence of nausea and vomiting  9/5/2024 1019 by Zana Pool, RN  Outcome: Progressing  Flowsheets (Taken 9/5/2024 1019)  Minimal or absence of nausea and vomiting: Administer IV fluids as ordered to ensure adequate hydration     Problem: Metabolic/Fluid and Electrolytes - Adult  Goal: Electrolytes maintained within normal limits  9/5/2024 1019 by Zana Pool, RN  Outcome: Progressing  Flowsheets (Taken 9/5/2024 1019)  Electrolytes maintained within normal limits: Monitor labs and assess patient for signs and symptoms of electrolyte imbalances

## 2024-09-05 NOTE — CARE COORDINATION
9/5/24, 1:29 PM EDT    DISCHARGE ON GOING EVALUATION    Joseph M Litten       [facility] day: 2  Location: -28/028-A Reason for admit: Alcohol dependence with uncomplicated withdrawal (HCC) [F10.230]  Acute hyperactive alcohol withdrawal delirium (HCC) [F10.931]     Procedures:   9/4 ECHO: EF 55-60%.     Imaging since last note:   9/4 CXR: New coarsened appearance and interstitial opacities in the lung  bases. This could resent early pneumonia versus mild pulmonary edema.  9/4 US Renal: Normal bilateral renal ultrasound. 2. Unremarkable urinary bladder.    Barriers to Discharge: Hospitalist following. Addiction Services. A&Ox4 on CM visit. IVF stopped. Folic acid. Multivitamin. IV phenobarbital per ETOH protocol- received 520mg so far today. Thiamine.     PCP: Jh Fried APRN - CNP  Readmission Risk Score: 10.3    Patient Goals/Plan/Treatment Preferences: Met w/ Christiano again today- Lj much more alert. He expressed need for rehabilitation facility at discharge. Re-explained process with Christiano. They plan to make phone calls today to find accepting facility with financial assistance.

## 2024-09-05 NOTE — PROGRESS NOTES
Hospitalist Progress Note      Patient:  Joseph M Litten 39 y.o. male       : 1985  Unit/Bed:-28/028-A    Date of Admission: 9/3/2024      ASSESSMENT AND PLAN    Active Problems  Acute alcohol withdrawal  Phenobarb PRN protocol continued.   Thiamine, folic acid, MTV.   Seizure precautions. Addiction services   Acute metabolic encephalopathy  Secondary to withdrawal.  If no improvement, can consider CTH, consider high dose thiamine.   Cocaine abuse, with acute withdrawal  Reports heavy daily use. Suspect withdrawal contributing to hypersomnia, agitation.   Hypothyroidism  Low TSH, low FT4; concern for central hypothyroidism.   Synthroid added .   Does not require inpt workup, will need OP repeat labs and endocrine referral.   Fever  Likely secondary to withdrawal.   Infectious workup negative thus far. No leukocytosis. UA pending. Blood cultures NGTD.  Tylenol PRN.    Folic acid and thiamine deficiency, with mild anemia  Add PO supplementation. CBC daily.    Elevated LFTs  AST>ALT, likely secondary to alcohol abuse. If no improvement, consider liver ultrasound.     Resolved Problems  Heart murmur  Echo completed and normal. Trace tricuspid regurg.    Chronic Conditions (reviewed, stable, and home medications resumed, unless otherwise stated)  Polysubstance abuse  Anxiety/depression  ARISTEO      LDA: []CVC / []PICC / []Midline / []Zuleta / []Drains / []Mediport / [x]None  Antibiotics: no  Steroids: no  Labs (still needed?): [x]Yes / []No  IVF (still needed?): []Yes / [x]No    Level of care: []Step Down / [x]Med-Surg  Bed Status: [x]Inpatient / []Observation  Telemetry: [x]Yes / []No  PT/OT: []Yes / [x]No    DVT Prophylaxis: [x] Lovenox / [] Heparin / [] SCDs / [] Already on Systemic Anticoagulation / [] None     Expected discharge date:  3 days  Disposition: Home     Code status: Full Code     ===================================================================    Chief Complaint: alcohol  replacement **OR** potassium chloride, magnesium sulfate, ondansetron **OR** ondansetron, polyethylene glycol, acetaminophen **OR** acetaminophen, PHENobarbital **OR** PHENobarbital **OR** PHENobarbital (LUMINAL) 726.05 mg in sodium chloride 0.9 % 100 mL IVPB    Exam:  BP (!) 152/80   Pulse 99   Temp 98.1 °F (36.7 °C) (Oral)   Resp 18   Ht 1.829 m (6')   Wt 76.9 kg (169 lb 8.5 oz)   SpO2 96%   BMI 22.99 kg/m²   General: No distress, appears stated age.   Eyes:  PERRL. Conjunctivae/corneas clear.  HENT: Head normal appearing. Nares normal. Oral mucosa moist.  Hearing intact.   Neck: Supple, with full range of motion. Trachea midline.  No gross JVD appreciated.  Respiratory:  Normal effort. Clear to auscultation, without rales or wheezes or rhonchi.  Cardiovascular: Normal rate, regular rhythm with normal S1/S2 without murmurs.    No lower extremity edema.   Abdomen: Soft, non-tender, non-distended with normal bowel sounds.  Musculoskeletal: No joint swelling or tenderness. Normal tone. No abnormal movements.   Skin: Warm and dry. No rashes or lesions.  Neurologic:  No obvious deficits, unable to fully assess. Not following commands.    Psychiatric: Arouses to verbal stimuli, unable to assess orientation.   Capillary Refill: Brisk,< 3 seconds.      Labs/Radiology: See chart or assessment above.     Electronically signed by Afsaneh Lyn PA-C on 9/5/2024 at 6:22 PM

## 2024-09-05 NOTE — CONSULTS
Brief Intervention and Referral to Treatment Summary    Patient was provided PHQ-9, AUDIT-C and DAST Screening:      PHQ-9 Score: 24  AUDIT-C Score:  12  DAST Score:  9    Patient’s substance use is considered     Dependent      Patient’s depression is considered:     Severe    Brief Education Was Provided    Patient was receptive      Brief Intervention Is Provided (Only for AUDIT-C or DAST)     Patient reports readiness to decrease and/or stop use and a plan was discussed. Gave them information for [facility]

## 2024-09-05 NOTE — PROGRESS NOTES
Utilize [facility] alcohol withdrawal scale (Based on Shirley Modified Alcohol Withdrawal Scale).  Tabulate score based on classifications including Tremor, Sweating, Hallucination, Orientation, and Agitation.    Tremor: 2  Sweatin  Hallucinations: 0  Orientation: 1  Agitation: 2  Total Score: 5  Action perform as described below     Tremor:  No tremor is 0 points.  Tremor on movement is 1 point.  Tremor at rest is 2 points.  Sweating: No Sweat 0 points. Moist is 1 point.  Drenching sweats is 2 points.  Hallucinations: No present 0 points. Dissuadable is 1 point. Not dissuadable is 2 points.  Orientation: Oriented 0 points. Vague/detached 1 point. Disoriented/no contact 2 points.  Agitation: Calm 0 points.  Anxious 1 point. Panicky 2 points.    Check scale every 2 hours.  Discontinue scoring with 4 consecutive scorings of 0.  Scale 0: No phenobarbital given.  Re-assess every 30 minutes as needed.   Scale 1-3: Phenobarbital 130 mg IV over 3 minutes. Re-assess every 30 minutes as needed.  May administer every 30 minutes to a maximum dose of phenobarbital 1040 mg in 24 hours!  Scale 4-8: Phenobarbital 260 mg IV over 5 minutes.  Re-assess every 30 minutes as needed. May administer every 30 minutes to a maximum dose of phenobarbital 1040mg in 24 hours!  Scale 9-10: Transfer to ICU (if not already in ICU).  Administer 10mg/kg phenobarbital IV over 30 minutes.  Maximum dose phenobarbital is 1040mg in 24 hours!

## 2024-09-05 NOTE — PROGRESS NOTES
Comprehensive Nutrition Assessment    Type and Reason for Visit: Initial, Positive Nutrition Screen (MST score 3)    Nutrition Recommendations/Plan:   Continue diet as ordered.   Initiate Ensure Plus BID.   Initiate Magic Cups BID.   Continue MVI, thiamine, and folic acid.      Malnutrition Assessment:  Malnutrition Status: Moderate malnutrition  Context: Social/Environmental Circumstances       Findings of the 6 clinical characteristics of malnutrition:  Energy Intake:  Unable to assess (patient reports that eats 3 meals per day, but guest in room shook her head that he does not eat well- unable to deteremine what is truthful)  Weight Loss:   (11.1% (20 lb) in the last 5 months which is significant)     Body Fat Loss:  Mild body fat loss Orbital, Triceps   Muscle Mass Loss:  Mild muscle mass loss Temples (temporalis)  Fluid Accumulation:  No significant fluid accumulation     Strength:  Not Performed    Nutrition Assessment:    Pt. moderately malnourished AEB criteria as listed above. At risk for further nutrition compromise r/t admitted d/t seeking assistance to stop alcohol and cocaine use. Patient reports he usually drinks 12 beers for the last 15-20 years and cocaine for the last 5-10 years. Noted underlying medical condition (PMHx alcohol abuse, drug abuse, MDD).    Nutrition Related Findings:    Pt. Report/Treatments/Miscellaneous: Patient seen, laying in bed, slightly confused. Guest sitting at bedside also provided information. Per patient his appetite has been good recently. He reports that his UBW is around 180 lb and that he hasn't lost any weight recently. Guest reports that this is not true and that he has been slowly losing weight over the last 3-4 months. Patient denies any nausea/ vomiting/ diarrhea/ constipation, but is having a lot of gas. He reports that he usually eats 3 meals per day, but guest shakes her head and disagrees with this. Patient is willing to trial Ensure Plus and Magic Cups.    GI Status: Last BM PTA- pt does not remember when his last BM was   Wound: None   Edema: none, per flowsheet   Pertinent Labs:   Lab Results   Component Value Date    LABA1C 5.2 09/04/2024     Recent Labs     09/03/24  1745 09/04/24  0345 09/05/24  0901    139 133*   K 4.1 4.1 4.4    103 101   GLUCOSE 123* 116* 120*   BUN 10 11 12   CREATININE 0.9 0.9 0.7   MG  --  2.2  --      Pertinent Medications: levothyroxine, thiamine, MVI, folic acid     Current Nutrition Intake & Therapies:    Recent PO intake: %  Recent Supplement Intake: None Ordered    - Current intake likely meets estimated needs.   ADULT DIET; Regular    Anthropometric Measures:  Height: 182.9 cm (6')  Ideal Body Weight (IBW): 178 lbs  Admission Body Amos: 72.6 kg (160 lb) (9/3, no edema)  Current Body Weight: 76.7 kg (169 lb) (9/5, no edema)  Current BMI: Body mass index is 22.99 kg/m².  Usual Body Weight:  (6/15: 185  lb; 4/7: 180 lb)  Weight Adjustment for: No Adjustment  BMI Categories: Normal (18.5-24.9)    Estimated Daily Nutrient Needs:  Energy (kcal/day): 3235-2395 kcal (22-25 kcal/kg)  Weight Used for energy calculation:  77 kg  Protein (g/day): 77-92 g (1-1.2 g/kg)    Weight Used for protein calculation: 77 kg    Fluid (ml/day): per MD     Nutrition Diagnosis:   Moderate malnutrition, In context of social or environmental circumstances related to inadequate protein-energy intake as evidenced by Criteria as identified in malnutrition assessment    Nutrition Interventions:   Nutrition and/ or Nutrient Delivery: Continue Current Diet, Start Oral Nutrition Supplement   Nutrition Education/ Counseling: Education initiated   Coordination of Nutrition Care: Continue to monitor while inpatient     Goals:  Goals: Meet at least 75% of estimated needs, by next RD assessment     Nutrition Monitoring and Evaluation:   Food/ Nutrient Intake Outcomes: Food and Nutrient Intake, Supplement Intake   Physical Sings/ Symptoms Outcomes:

## 2024-09-05 NOTE — PROGRESS NOTES
Pt was in bed as his significant other was with him. He was dealing with alcoholic abuse. He was happy for the visit and asked for prayer. He was encouraged and blessed. It was appreciated.    09/05/24 1620   Encounter Summary   Encounter Overview/Reason Initial Encounter   Service Provided For Patient   Referral/Consult From [facility]   Support System Significant other   Last Encounter  09/05/24   Complexity of Encounter Low   Begin Time 1300   End Time  1307   Total Time Calculated 7 min   Spiritual/Emotional needs   Type Spiritual Support   Assessment/Intervention/Outcome   Assessment Anxious   Intervention Empowerment

## 2024-09-05 NOTE — PROGRESS NOTES
Utilize [facility] alcohol withdrawal scale (Based on Shirley Modified Alcohol Withdrawal Scale).  Tabulate score based on classifications including Tremor, Sweating, Hallucination, Orientation, and Agitation.    Tremor: 1   Sweatin  Hallucinations: 0  Orientation: 1  Agitation: 1   Total Score: 4   Action perform as described below     Tremor:  No tremor is 0 points.  Tremor on movement is 1 point.  Tremor at rest is 2 points.  Sweating: No Sweat 0 points. Moist is 1 point.  Drenching sweats is 2 points.  Hallucinations: No present 0 points. Dissuadable is 1 point. Not dissuadable is 2 points.  Orientation: Oriented 0 points. Vague/detached 1 point. Disoriented/no contact 2 points.  Agitation: Calm 0 points.  Anxious 1 point. Panicky 2 points.    Check scale every 2 hours.  Discontinue scoring with 4 consecutive scorings of 0.  Scale 0: No phenobarbital given.  Re-assess every 60 minutes as needed.   Scale 1-3: Phenobarbital 130 mg IV over 3 minutes. Re-assess every 60 minutes as needed.  May administer every 60 minutes to a maximum dose of phenobarbital 1040 mg in 24 hours!  Scale 4-8: Phenobarbital 260 mg IV over 5 minutes.  Re-assess every 60 minutes as needed. May administer every 60 minutes to a maximum dose of phenobarbital 1040mg in 24 hours!  Scale 9-10: Transfer to ICU (if not already in ICU).  Administer 10mg/kg phenobarbital IV over 60 minutes.  Maximum dose phenobarbital is 1040mg in 24 hours!

## 2024-09-05 NOTE — PLAN OF CARE
Problem: Gastrointestinal - Adult  Goal: Minimal or absence of nausea and vomiting  Outcome: Progressing     Problem: Metabolic/Fluid and Electrolytes - Adult  Goal: Electrolytes maintained within normal limits  Outcome: Progressing     Problem: Safety - Adult  Goal: Free from fall injury  Outcome: Progressing     Problem: Pain  Goal: Verbalizes/displays adequate comfort level or baseline comfort level  Outcome: Progressing

## 2024-09-06 VITALS
HEART RATE: 92 BPM | BODY MASS INDEX: 24.4 KG/M2 | RESPIRATION RATE: 18 BRPM | SYSTOLIC BLOOD PRESSURE: 134 MMHG | TEMPERATURE: 98.4 F | DIASTOLIC BLOOD PRESSURE: 77 MMHG | HEIGHT: 72 IN | OXYGEN SATURATION: 96 % | WEIGHT: 180.12 LBS

## 2024-09-06 LAB
ALBUMIN SERPL BCG-MCNC: 3.4 G/DL (ref 3.5–5.1)
ALP SERPL-CCNC: 139 U/L (ref 38–126)
ALT SERPL W/O P-5'-P-CCNC: 92 U/L (ref 11–66)
AMMONIA PLAS-MCNC: 33 UMOL/L (ref 11–60)
ANION GAP SERPL CALC-SCNC: 8 MEQ/L (ref 8–16)
AST SERPL-CCNC: 105 U/L (ref 5–40)
BACTERIA BLD AEROBE CULT: NORMAL
BACTERIA BLD AEROBE CULT: NORMAL
BACTERIA UR CULT: NORMAL
BILIRUB SERPL-MCNC: 0.3 MG/DL (ref 0.3–1.2)
BUN SERPL-MCNC: 12 MG/DL (ref 7–22)
CALCIUM SERPL-MCNC: 8.2 MG/DL (ref 8.5–10.5)
CHLORIDE SERPL-SCNC: 100 MEQ/L (ref 98–111)
CO2 SERPL-SCNC: 26 MEQ/L (ref 23–33)
CREAT SERPL-MCNC: 0.8 MG/DL (ref 0.4–1.2)
DEPRECATED RDW RBC AUTO: 50.8 FL (ref 35–45)
ERYTHROCYTE [DISTWIDTH] IN BLOOD BY AUTOMATED COUNT: 13.6 % (ref 11.5–14.5)
GFR SERPL CREATININE-BSD FRML MDRD: > 90 ML/MIN/1.73M2
GLUCOSE SERPL-MCNC: 97 MG/DL (ref 70–108)
HCT VFR BLD AUTO: 41.6 % (ref 42–52)
HGB BLD-MCNC: 13.8 GM/DL (ref 14–18)
MCH RBC QN AUTO: 33.5 PG (ref 26–33)
MCHC RBC AUTO-ENTMCNC: 33.2 GM/DL (ref 32.2–35.5)
MCV RBC AUTO: 101 FL (ref 80–94)
PLATELET # BLD AUTO: 152 THOU/MM3 (ref 130–400)
PMV BLD AUTO: 9.9 FL (ref 9.4–12.4)
POTASSIUM SERPL-SCNC: 4.7 MEQ/L (ref 3.5–5.2)
PROT SERPL-MCNC: 6.3 G/DL (ref 6.1–8)
RBC # BLD AUTO: 4.12 MILL/MM3 (ref 4.7–6.1)
SODIUM SERPL-SCNC: 134 MEQ/L (ref 135–145)
T3FREE SERPL-MCNC: 2.5 PG/ML (ref 2–4.4)
WBC # BLD AUTO: 7.4 THOU/MM3 (ref 4.8–10.8)

## 2024-09-06 PROCEDURE — 82140 ASSAY OF AMMONIA: CPT

## 2024-09-06 PROCEDURE — 6370000000 HC RX 637 (ALT 250 FOR IP): Performed by: PHYSICIAN ASSISTANT

## 2024-09-06 PROCEDURE — 80053 COMPREHEN METABOLIC PANEL: CPT

## 2024-09-06 PROCEDURE — 36415 COLL VENOUS BLD VENIPUNCTURE: CPT

## 2024-09-06 PROCEDURE — 85027 COMPLETE CBC AUTOMATED: CPT

## 2024-09-06 PROCEDURE — 6360000002 HC RX W HCPCS: Performed by: NURSE PRACTITIONER

## 2024-09-06 RX ADMIN — LEVOTHYROXINE SODIUM 50 MCG: 0.05 TABLET ORAL at 07:17

## 2024-09-06 RX ADMIN — PHENOBARBITAL SODIUM 130 MG: 65 INJECTION INTRAMUSCULAR at 07:16

## 2024-09-06 RX ADMIN — NICOTINE POLACRILEX 2 MG: 2 LOZENGE ORAL at 01:48

## 2024-09-06 NOTE — PROGRESS NOTES
Utilize [facility] alcohol withdrawal scale (Based on Shirley Modified Alcohol Withdrawal Scale).  Tabulate score based on classifications including Tremor, Sweating, Hallucination, Orientation, and Agitation.    Tremor: 1  Sweatin  Hallucinations: 0  Orientation: 0  Agitation: 2  Total Score: 3  Action perform as described below     Tremor:  No tremor is 0 points.  Tremor on movement is 1 point.  Tremor at rest is 2 points.  Sweating: No Sweat 0 points. Moist is 1 point.  Drenching sweats is 2 points.  Hallucinations: No present 0 points. Dissuadable is 1 point. Not dissuadable is 2 points.  Orientation: Oriented 0 points. Vague/detached 1 point. Disoriented/no contact 2 points.  Agitation: Calm 0 points.  Anxious 1 point. Panicky 2 points.    Check scale every 2 hours.  Discontinue scoring with 4 consecutive scorings of 0.  Scale 0: No phenobarbital given.  Re-assess every 30 minutes as needed.   Scale 1-3: Phenobarbital 130 mg IV over 3 minutes. Re-assess every 30 minutes as needed.  May administer every 30 minutes to a maximum dose of phenobarbital 1040 mg in 24 hours!  Scale 4-8: Phenobarbital 260 mg IV over 5 minutes.  Re-assess every 30 minutes as needed. May administer every 30 minutes to a maximum dose of phenobarbital 1040mg in 24 hours!  Scale 9-10: Transfer to ICU (if not already in ICU).  Administer 10mg/kg phenobarbital IV over 30 minutes.  Maximum dose phenobarbital is 1040mg in 24 hours!

## 2024-09-06 NOTE — PROGRESS NOTES
Pt IV leaking , removed IV, Pt refusing alarm up walking in room unsteady, educated Pt of safety and risk of falling Pt states understanding and stated \"I'm not going to fucking fall\", notified PA, she stated Pt not EMC but shouldn't leave floor due to recent dose of pheno, notified Pt he walked off floor then returned to room, grab something, then proceeded to try to open hallway doors went down stairs stated Im leaving. Notified security and PA.

## 2024-09-06 NOTE — PLAN OF CARE
Problem: Discharge Planning  Goal: Discharge to home or other facility with appropriate resources  Outcome: Progressing  Flowsheets (Taken 9/6/2024 0412)  Discharge to home or other facility with appropriate resources:   Identify barriers to discharge with patient and caregiver   Identify discharge learning needs (meds, wound care, etc)   Refer to discharge planning if patient needs post-hospital services based on physician order or complex needs related to functional status, cognitive ability or social support system   Arrange for needed discharge resources and transportation as appropriate   Arrange for interpreters to assist at discharge as needed     Problem: Gastrointestinal - Adult  Goal: Minimal or absence of nausea and vomiting  Outcome: Progressing  Flowsheets (Taken 9/6/2024 0412)  Minimal or absence of nausea and vomiting:   Administer IV fluids as ordered to ensure adequate hydration   Nasogastric tube to low intermittent suction as ordered   Provide nonpharmacologic comfort measures as appropriate   Nutrition consult to assist patient with adequate nutrition and appropriate food choices   Maintain NPO status until nausea and vomiting are resolved   Advance diet as tolerated, if ordered   Administer ordered antiemetic medications as needed     Problem: Metabolic/Fluid and Electrolytes - Adult  Goal: Electrolytes maintained within normal limits  Outcome: Progressing  Flowsheets (Taken 9/6/2024 0412)  Electrolytes maintained within normal limits:   Monitor labs and assess patient for signs and symptoms of electrolyte imbalances   Monitor response to electrolyte replacements, including repeat lab results as appropriate   Instruct patient on fluid and nutrition restrictions as appropriate   Administer electrolyte replacement as ordered   Fluid restriction as ordered     Problem: Safety - Adult  Goal: Free from fall injury  Outcome: Progressing  Flowsheets (Taken 9/6/2024 0412)  Free From Fall Injury:

## 2024-09-07 ENCOUNTER — HOSPITAL ENCOUNTER (INPATIENT)
Age: 39
LOS: 2 days | Discharge: LEFT BEFORE DISCHARGE | DRG: 894 | End: 2024-09-09
Admitting: STUDENT IN AN ORGANIZED HEALTH CARE EDUCATION/TRAINING PROGRAM

## 2024-09-07 DIAGNOSIS — F10.10 ALCOHOL ABUSE: Primary | ICD-10-CM

## 2024-09-07 DIAGNOSIS — F14.10 COCAINE ABUSE (HCC): ICD-10-CM

## 2024-09-07 PROBLEM — F10.139 ALCOHOL ABUSE WITH WITHDRAWAL (HCC): Status: ACTIVE | Noted: 2024-09-07

## 2024-09-07 LAB
ALBUMIN SERPL BCG-MCNC: 3.9 G/DL (ref 3.5–5.1)
ALP SERPL-CCNC: 138 U/L (ref 38–126)
ALT SERPL W/O P-5'-P-CCNC: 93 U/L (ref 11–66)
AMPHETAMINES UR QL SCN: NEGATIVE
ANION GAP SERPL CALC-SCNC: 12 MEQ/L (ref 8–16)
APAP SERPL-MCNC: < 5 UG/ML (ref 0–20)
AST SERPL-CCNC: 103 U/L (ref 5–40)
BACTERIA: NORMAL
BARBITURATES UR QL SCN: POSITIVE
BASOPHILS ABSOLUTE: 0.1 THOU/MM3 (ref 0–0.1)
BASOPHILS NFR BLD AUTO: 1.1 %
BENZODIAZ UR QL SCN: NEGATIVE
BILIRUB CONJ SERPL-MCNC: 0.2 MG/DL (ref 0.1–13.8)
BILIRUB SERPL-MCNC: 0.4 MG/DL (ref 0.3–1.2)
BILIRUB UR QL STRIP: NEGATIVE
BUN SERPL-MCNC: 11 MG/DL (ref 7–22)
BZE UR QL SCN: POSITIVE
CALCIUM SERPL-MCNC: 8.6 MG/DL (ref 8.5–10.5)
CANNABINOIDS UR QL SCN: POSITIVE
CASTS #/AREA URNS LPF: NORMAL /LPF
CASTS #/AREA URNS LPF: NORMAL /LPF
CHARACTER UR: CLEAR
CHARCOAL URNS QL MICRO: NORMAL
CHLORIDE SERPL-SCNC: 96 MEQ/L (ref 98–111)
CO2 SERPL-SCNC: 25 MEQ/L (ref 23–33)
COLOR UR: YELLOW
CREAT SERPL-MCNC: 0.8 MG/DL (ref 0.4–1.2)
CRYSTALS URNS QL MICRO: NORMAL
DEPRECATED RDW RBC AUTO: 47 FL (ref 35–45)
EOSINOPHIL NFR BLD AUTO: 2.7 %
EOSINOPHILS ABSOLUTE: 0.1 THOU/MM3 (ref 0–0.4)
EPITHELIAL CELLS, UA: NORMAL /HPF
ERYTHROCYTE [DISTWIDTH] IN BLOOD BY AUTOMATED COUNT: 13.2 % (ref 11.5–14.5)
ETHANOL SERPL-MCNC: < 0.01 % (ref 0–0.08)
FENTANYL: NEGATIVE
GFR SERPL CREATININE-BSD FRML MDRD: > 90 ML/MIN/1.73M2
GLUCOSE SERPL-MCNC: 95 MG/DL (ref 70–108)
GLUCOSE UR QL STRIP.AUTO: NEGATIVE MG/DL
HCT VFR BLD AUTO: 39.7 % (ref 42–52)
HGB BLD-MCNC: 13.2 GM/DL (ref 14–18)
HGB UR QL STRIP.AUTO: NEGATIVE
IMM GRANULOCYTES # BLD AUTO: 0.04 THOU/MM3 (ref 0–0.07)
IMM GRANULOCYTES NFR BLD AUTO: 0.7 %
KETONES UR QL STRIP.AUTO: NEGATIVE
LEUKOCYTE ESTERASE UR QL STRIP.AUTO: NEGATIVE
LYMPHOCYTES ABSOLUTE: 3 THOU/MM3 (ref 1–4.8)
LYMPHOCYTES NFR BLD AUTO: 54.8 %
MCH RBC QN AUTO: 32.4 PG (ref 26–33)
MCHC RBC AUTO-ENTMCNC: 33.2 GM/DL (ref 32.2–35.5)
MCV RBC AUTO: 97.5 FL (ref 80–94)
MONOCYTES ABSOLUTE: 0.3 THOU/MM3 (ref 0.4–1.3)
MONOCYTES NFR BLD AUTO: 6.2 %
NEUTROPHILS ABSOLUTE: 1.9 THOU/MM3 (ref 1.8–7.7)
NEUTROPHILS NFR BLD AUTO: 34.5 %
NITRITE UR QL STRIP.AUTO: NEGATIVE
NRBC BLD AUTO-RTO: 0 /100 WBC
OPIATES UR QL SCN: NEGATIVE
ORIGINAL SAMPLE NUMBER: NORMAL
OSMOLALITY SERPL CALC.SUM OF ELEC: 265.6 MOSMOL/KG (ref 275–300)
OSMOLALITY UR: NORMAL MOSMOL/KG (ref 250–750)
OXYCODONE: NEGATIVE
PCP UR QL SCN: NEGATIVE
PH UR STRIP.AUTO: 6.5 [PH] (ref 5–9)
PLATELET # BLD AUTO: 147 THOU/MM3 (ref 130–400)
PLATELET BLD QL SMEAR: ADEQUATE
PMV BLD AUTO: 10.7 FL (ref 9.4–12.4)
POTASSIUM SERPL-SCNC: 3.7 MEQ/L (ref 3.5–5.2)
PROT SERPL-MCNC: 6.8 G/DL (ref 6.1–8)
PROT UR STRIP.AUTO-MCNC: NEGATIVE MG/DL
RBC # BLD AUTO: 4.07 MILL/MM3 (ref 4.7–6.1)
RBC #/AREA URNS HPF: NORMAL /HPF
REFERENCE LOCATION: NORMAL
REFERENCE RANGE: NORMAL
RENAL EPI CELLS #/AREA URNS HPF: NORMAL /[HPF]
SALICYLATES SERPL-MCNC: < 0.3 MG/DL (ref 2–10)
SCAN OF BLOOD SMEAR: NORMAL
SODIUM SERPL-SCNC: 133 MEQ/L (ref 135–145)
SODIUM UR-SCNC: 61 MEQ/L
SPECIFIC GRAVITY UA: 1.01 (ref 1–1.03)
TEST RESULTS WITH UNITS: NORMAL
TEST(S) BEING PERFORMED: NORMAL
TOXIC GRANULES BLD QL SMEAR: PRESENT
UROBILINOGEN, URINE: 1 EU/DL (ref 0–1)
VARIANT LYMPHS BLD QL SMEAR: ABNORMAL %
WBC # BLD AUTO: 5.5 THOU/MM3 (ref 4.8–10.8)
WBC #/AREA URNS HPF: NORMAL /HPF
YEAST LIKE FUNGI URNS QL MICRO: NORMAL

## 2024-09-07 PROCEDURE — 99223 1ST HOSP IP/OBS HIGH 75: CPT

## 2024-09-07 PROCEDURE — 80307 DRUG TEST PRSMV CHEM ANLYZR: CPT

## 2024-09-07 PROCEDURE — 99285 EMERGENCY DEPT VISIT HI MDM: CPT

## 2024-09-07 PROCEDURE — 2580000003 HC RX 258

## 2024-09-07 PROCEDURE — 80143 DRUG ASSAY ACETAMINOPHEN: CPT

## 2024-09-07 PROCEDURE — 6370000000 HC RX 637 (ALT 250 FOR IP)

## 2024-09-07 PROCEDURE — 81001 URINALYSIS AUTO W/SCOPE: CPT

## 2024-09-07 PROCEDURE — 80179 DRUG ASSAY SALICYLATE: CPT

## 2024-09-07 PROCEDURE — 80053 COMPREHEN METABOLIC PANEL: CPT

## 2024-09-07 PROCEDURE — 82248 BILIRUBIN DIRECT: CPT

## 2024-09-07 PROCEDURE — 36415 COLL VENOUS BLD VENIPUNCTURE: CPT

## 2024-09-07 PROCEDURE — 82077 ASSAY SPEC XCP UR&BREATH IA: CPT

## 2024-09-07 PROCEDURE — 84300 ASSAY OF URINE SODIUM: CPT

## 2024-09-07 PROCEDURE — 87086 URINE CULTURE/COLONY COUNT: CPT

## 2024-09-07 PROCEDURE — 2500000003 HC RX 250 WO HCPCS

## 2024-09-07 PROCEDURE — 85025 COMPLETE CBC W/AUTO DIFF WBC: CPT

## 2024-09-07 PROCEDURE — 83935 ASSAY OF URINE OSMOLALITY: CPT

## 2024-09-07 PROCEDURE — 6360000002 HC RX W HCPCS

## 2024-09-07 PROCEDURE — 1200000003 HC TELEMETRY R&B

## 2024-09-07 PROCEDURE — 6360000002 HC RX W HCPCS: Performed by: NURSE PRACTITIONER

## 2024-09-07 RX ORDER — PHENOBARBITAL 32.4 MG/1
16.2 TABLET ORAL EVERY 6 HOURS PRN
Status: DISCONTINUED | OUTPATIENT
Start: 2024-09-09 | End: 2024-09-07

## 2024-09-07 RX ORDER — PHENOBARBITAL 32.4 MG/1
64.8 TABLET ORAL 2 TIMES DAILY
Status: DISCONTINUED | OUTPATIENT
Start: 2024-09-07 | End: 2024-09-07

## 2024-09-07 RX ORDER — SODIUM CHLORIDE 0.9 % (FLUSH) 0.9 %
5-40 SYRINGE (ML) INJECTION EVERY 12 HOURS SCHEDULED
Status: DISCONTINUED | OUTPATIENT
Start: 2024-09-07 | End: 2024-09-09 | Stop reason: HOSPADM

## 2024-09-07 RX ORDER — LANOLIN ALCOHOL/MO/W.PET/CERES
100 CREAM (GRAM) TOPICAL DAILY
Status: DISCONTINUED | OUTPATIENT
Start: 2024-09-07 | End: 2024-09-09 | Stop reason: HOSPADM

## 2024-09-07 RX ORDER — LORAZEPAM 1 MG/1
3 TABLET ORAL
Status: DISCONTINUED | OUTPATIENT
Start: 2024-09-07 | End: 2024-09-09 | Stop reason: HOSPADM

## 2024-09-07 RX ORDER — NICOTINE 21 MG/24HR
1 PATCH, TRANSDERMAL 24 HOURS TRANSDERMAL DAILY
Status: DISCONTINUED | OUTPATIENT
Start: 2024-09-07 | End: 2024-09-09 | Stop reason: HOSPADM

## 2024-09-07 RX ORDER — LORAZEPAM 1 MG/1
2 TABLET ORAL
Status: DISCONTINUED | OUTPATIENT
Start: 2024-09-07 | End: 2024-09-09 | Stop reason: HOSPADM

## 2024-09-07 RX ORDER — ENOXAPARIN SODIUM 100 MG/ML
40 INJECTION SUBCUTANEOUS DAILY
Status: DISCONTINUED | OUTPATIENT
Start: 2024-09-07 | End: 2024-09-09 | Stop reason: HOSPADM

## 2024-09-07 RX ORDER — PHENOBARBITAL 32.4 MG/1
32.4 TABLET ORAL 2 TIMES DAILY
Status: DISCONTINUED | OUTPATIENT
Start: 2024-09-08 | End: 2024-09-07

## 2024-09-07 RX ORDER — ONDANSETRON 2 MG/ML
4 INJECTION INTRAMUSCULAR; INTRAVENOUS EVERY 6 HOURS PRN
Status: DISCONTINUED | OUTPATIENT
Start: 2024-09-07 | End: 2024-09-09 | Stop reason: HOSPADM

## 2024-09-07 RX ORDER — SODIUM CHLORIDE 9 MG/ML
INJECTION, SOLUTION INTRAVENOUS PRN
Status: DISCONTINUED | OUTPATIENT
Start: 2024-09-07 | End: 2024-09-09 | Stop reason: HOSPADM

## 2024-09-07 RX ORDER — POTASSIUM CHLORIDE 1500 MG/1
40 TABLET, EXTENDED RELEASE ORAL PRN
Status: DISCONTINUED | OUTPATIENT
Start: 2024-09-07 | End: 2024-09-09 | Stop reason: HOSPADM

## 2024-09-07 RX ORDER — FOLIC ACID 1 MG/1
1 TABLET ORAL DAILY
Status: DISCONTINUED | OUTPATIENT
Start: 2024-09-07 | End: 2024-09-09 | Stop reason: HOSPADM

## 2024-09-07 RX ORDER — ACETAMINOPHEN 325 MG/1
650 TABLET ORAL EVERY 6 HOURS PRN
Status: DISCONTINUED | OUTPATIENT
Start: 2024-09-07 | End: 2024-09-09 | Stop reason: HOSPADM

## 2024-09-07 RX ORDER — PHENOBARBITAL 32.4 MG/1
32.4 TABLET ORAL EVERY 6 HOURS PRN
Status: DISCONTINUED | OUTPATIENT
Start: 2024-09-07 | End: 2024-09-07

## 2024-09-07 RX ORDER — POLYETHYLENE GLYCOL 3350 17 G
2 POWDER IN PACKET (EA) ORAL
Status: DISCONTINUED | OUTPATIENT
Start: 2024-09-07 | End: 2024-09-09 | Stop reason: HOSPADM

## 2024-09-07 RX ORDER — PHENOBARBITAL 32.4 MG/1
16.2 TABLET ORAL 2 TIMES DAILY
Status: DISCONTINUED | OUTPATIENT
Start: 2024-09-09 | End: 2024-09-07

## 2024-09-07 RX ORDER — LORAZEPAM 1 MG/1
4 TABLET ORAL
Status: DISCONTINUED | OUTPATIENT
Start: 2024-09-07 | End: 2024-09-09 | Stop reason: HOSPADM

## 2024-09-07 RX ORDER — LORAZEPAM 2 MG/ML
1 INJECTION INTRAMUSCULAR
Status: DISCONTINUED | OUTPATIENT
Start: 2024-09-07 | End: 2024-09-09 | Stop reason: HOSPADM

## 2024-09-07 RX ORDER — LORAZEPAM 1 MG/1
1 TABLET ORAL
Status: DISCONTINUED | OUTPATIENT
Start: 2024-09-07 | End: 2024-09-09 | Stop reason: HOSPADM

## 2024-09-07 RX ORDER — POTASSIUM CHLORIDE 7.45 MG/ML
10 INJECTION INTRAVENOUS PRN
Status: DISCONTINUED | OUTPATIENT
Start: 2024-09-07 | End: 2024-09-09 | Stop reason: HOSPADM

## 2024-09-07 RX ORDER — SODIUM CHLORIDE 0.9 % (FLUSH) 0.9 %
5-40 SYRINGE (ML) INJECTION PRN
Status: DISCONTINUED | OUTPATIENT
Start: 2024-09-07 | End: 2024-09-09 | Stop reason: HOSPADM

## 2024-09-07 RX ORDER — POLYETHYLENE GLYCOL 3350 17 G/17G
17 POWDER, FOR SOLUTION ORAL DAILY PRN
Status: DISCONTINUED | OUTPATIENT
Start: 2024-09-07 | End: 2024-09-09 | Stop reason: HOSPADM

## 2024-09-07 RX ORDER — LORAZEPAM 2 MG/ML
3 INJECTION INTRAMUSCULAR
Status: DISCONTINUED | OUTPATIENT
Start: 2024-09-07 | End: 2024-09-09 | Stop reason: HOSPADM

## 2024-09-07 RX ORDER — LORAZEPAM 2 MG/ML
2 INJECTION INTRAMUSCULAR
Status: DISCONTINUED | OUTPATIENT
Start: 2024-09-07 | End: 2024-09-09 | Stop reason: HOSPADM

## 2024-09-07 RX ORDER — ACETAMINOPHEN 650 MG/1
650 SUPPOSITORY RECTAL EVERY 6 HOURS PRN
Status: DISCONTINUED | OUTPATIENT
Start: 2024-09-07 | End: 2024-09-09 | Stop reason: HOSPADM

## 2024-09-07 RX ORDER — SODIUM CHLORIDE 9 MG/ML
INJECTION, SOLUTION INTRAVENOUS CONTINUOUS
Status: ACTIVE | OUTPATIENT
Start: 2024-09-07 | End: 2024-09-07

## 2024-09-07 RX ORDER — MAGNESIUM SULFATE IN WATER 40 MG/ML
2000 INJECTION, SOLUTION INTRAVENOUS PRN
Status: DISCONTINUED | OUTPATIENT
Start: 2024-09-07 | End: 2024-09-09 | Stop reason: HOSPADM

## 2024-09-07 RX ORDER — ONDANSETRON 4 MG/1
4 TABLET, ORALLY DISINTEGRATING ORAL EVERY 8 HOURS PRN
Status: DISCONTINUED | OUTPATIENT
Start: 2024-09-07 | End: 2024-09-09 | Stop reason: HOSPADM

## 2024-09-07 RX ORDER — MULTIVITAMIN WITH IRON
1 TABLET ORAL DAILY
Status: DISCONTINUED | OUTPATIENT
Start: 2024-09-07 | End: 2024-09-09 | Stop reason: HOSPADM

## 2024-09-07 RX ORDER — KETOROLAC TROMETHAMINE 30 MG/ML
15 INJECTION, SOLUTION INTRAMUSCULAR; INTRAVENOUS EVERY 8 HOURS PRN
Status: DISCONTINUED | OUTPATIENT
Start: 2024-09-07 | End: 2024-09-09 | Stop reason: HOSPADM

## 2024-09-07 RX ORDER — LORAZEPAM 2 MG/ML
4 INJECTION INTRAMUSCULAR
Status: DISCONTINUED | OUTPATIENT
Start: 2024-09-07 | End: 2024-09-09 | Stop reason: HOSPADM

## 2024-09-07 RX ORDER — PHENOBARBITAL 32.4 MG/1
32.4 TABLET ORAL 4 TIMES DAILY
Status: DISCONTINUED | OUTPATIENT
Start: 2024-09-07 | End: 2024-09-07

## 2024-09-07 RX ADMIN — LORAZEPAM 2 MG: 1 TABLET ORAL at 19:51

## 2024-09-07 RX ADMIN — Medication 100 MG: at 11:39

## 2024-09-07 RX ADMIN — KETOROLAC TROMETHAMINE 15 MG: 30 INJECTION, SOLUTION INTRAMUSCULAR at 20:47

## 2024-09-07 RX ADMIN — ENOXAPARIN SODIUM 40 MG: 100 INJECTION SUBCUTANEOUS at 11:44

## 2024-09-07 RX ADMIN — NICOTINE POLACRILEX 2 MG: 2 LOZENGE ORAL at 11:50

## 2024-09-07 RX ADMIN — SODIUM CHLORIDE, PRESERVATIVE FREE 10 ML: 5 INJECTION INTRAVENOUS at 19:44

## 2024-09-07 RX ADMIN — Medication 1 TABLET: at 11:39

## 2024-09-07 RX ADMIN — THIAMINE HYDROCHLORIDE: 100 INJECTION, SOLUTION INTRAMUSCULAR; INTRAVENOUS at 06:18

## 2024-09-07 RX ADMIN — NICOTINE POLACRILEX 2 MG: 2 LOZENGE ORAL at 19:44

## 2024-09-07 RX ADMIN — FOLIC ACID 1 MG: 1 TABLET ORAL at 11:39

## 2024-09-07 RX ADMIN — LORAZEPAM 2 MG: 1 TABLET ORAL at 21:38

## 2024-09-07 RX ADMIN — ACETAMINOPHEN 650 MG: 325 TABLET ORAL at 19:43

## 2024-09-07 ASSESSMENT — LIFESTYLE VARIABLES
HOW MANY STANDARD DRINKS CONTAINING ALCOHOL DO YOU HAVE ON A TYPICAL DAY: 10 OR MORE
HOW OFTEN DO YOU HAVE A DRINK CONTAINING ALCOHOL: 4 OR MORE TIMES A WEEK
HOW OFTEN DO YOU HAVE A DRINK CONTAINING ALCOHOL: 4 OR MORE TIMES A WEEK
HOW MANY STANDARD DRINKS CONTAINING ALCOHOL DO YOU HAVE ON A TYPICAL DAY: 10 OR MORE

## 2024-09-07 ASSESSMENT — PAIN DESCRIPTION - LOCATION: LOCATION: BACK

## 2024-09-07 ASSESSMENT — PAIN - FUNCTIONAL ASSESSMENT
PAIN_FUNCTIONAL_ASSESSMENT: PREVENTS OR INTERFERES SOME ACTIVE ACTIVITIES AND ADLS
PAIN_FUNCTIONAL_ASSESSMENT: NONE - DENIES PAIN
PAIN_FUNCTIONAL_ASSESSMENT: NONE - DENIES PAIN

## 2024-09-07 ASSESSMENT — PATIENT HEALTH QUESTIONNAIRE - PHQ9
SUM OF ALL RESPONSES TO PHQ QUESTIONS 1-9: 0
SUM OF ALL RESPONSES TO PHQ9 QUESTIONS 1 & 2: 0
SUM OF ALL RESPONSES TO PHQ QUESTIONS 1-9: 0
1. LITTLE INTEREST OR PLEASURE IN DOING THINGS: NOT AT ALL
2. FEELING DOWN, DEPRESSED OR HOPELESS: NOT AT ALL

## 2024-09-07 ASSESSMENT — PAIN SCALES - GENERAL: PAINLEVEL_OUTOF10: 10

## 2024-09-07 ASSESSMENT — PAIN DESCRIPTION - DESCRIPTORS: DESCRIPTORS: ACHING

## 2024-09-07 ASSESSMENT — PAIN DESCRIPTION - ORIENTATION: ORIENTATION: LOWER;MID

## 2024-09-08 LAB
ALBUMIN SERPL BCG-MCNC: 3.4 G/DL (ref 3.5–5.1)
ALP SERPL-CCNC: 176 U/L (ref 38–126)
ALT SERPL W/O P-5'-P-CCNC: 94 U/L (ref 11–66)
AMPHETAMINES UR QL SCN: NEGATIVE
ANION GAP SERPL CALC-SCNC: 11 MEQ/L (ref 8–16)
ANISOCYTOSIS BLD QL SMEAR: PRESENT
APTT PPP: 33.5 SECONDS (ref 22–38)
AST SERPL-CCNC: 93 U/L (ref 5–40)
BARBITURATES UR QL SCN: POSITIVE
BASOPHILS ABSOLUTE: 0 THOU/MM3 (ref 0–0.1)
BASOPHILS NFR BLD AUTO: 0.8 %
BENZODIAZ UR QL SCN: POSITIVE
BILIRUB SERPL-MCNC: 0.2 MG/DL (ref 0.3–1.2)
BUN SERPL-MCNC: 14 MG/DL (ref 7–22)
BZE UR QL SCN: POSITIVE
CA-I BLD ISE-SCNC: 1.11 MMOL/L (ref 1.12–1.32)
CALCIUM SERPL-MCNC: 8.2 MG/DL (ref 8.5–10.5)
CANNABINOIDS UR QL SCN: POSITIVE
CHLORIDE SERPL-SCNC: 100 MEQ/L (ref 98–111)
CO2 SERPL-SCNC: 23 MEQ/L (ref 23–33)
CREAT SERPL-MCNC: 0.6 MG/DL (ref 0.4–1.2)
DEPRECATED RDW RBC AUTO: 49.1 FL (ref 35–45)
EOSINOPHIL NFR BLD AUTO: 3.2 %
EOSINOPHILS ABSOLUTE: 0.2 THOU/MM3 (ref 0–0.4)
ERYTHROCYTE [DISTWIDTH] IN BLOOD BY AUTOMATED COUNT: 13.5 % (ref 11.5–14.5)
FENTANYL: NEGATIVE
GFR SERPL CREATININE-BSD FRML MDRD: > 90 ML/MIN/1.73M2
GLUCOSE SERPL-MCNC: 99 MG/DL (ref 70–108)
HAV IGM SER QL: NEGATIVE
HBV CORE IGM SERPL QL IA: NEGATIVE
HBV SURFACE AG SERPL QL IA: NEGATIVE
HCT VFR BLD AUTO: 39.4 % (ref 42–52)
HCV IGG SERPL QL IA: NEGATIVE
HGB BLD-MCNC: 13 GM/DL (ref 14–18)
IMM GRANULOCYTES # BLD AUTO: 0.02 THOU/MM3 (ref 0–0.07)
IMM GRANULOCYTES NFR BLD AUTO: 0.3 %
INR PPP: 0.95 (ref 0.85–1.13)
LYMPHOCYTES ABSOLUTE: 2.9 THOU/MM3 (ref 1–4.8)
LYMPHOCYTES NFR BLD AUTO: 48.6 %
MCH RBC QN AUTO: 32 PG (ref 26–33)
MCHC RBC AUTO-ENTMCNC: 33 GM/DL (ref 32.2–35.5)
MCV RBC AUTO: 97 FL (ref 80–94)
MONOCYTES ABSOLUTE: 0.4 THOU/MM3 (ref 0.4–1.3)
MONOCYTES NFR BLD AUTO: 6.1 %
NEUTROPHILS ABSOLUTE: 2.4 THOU/MM3 (ref 1.8–7.7)
NEUTROPHILS NFR BLD AUTO: 41 %
NRBC BLD AUTO-RTO: 0 /100 WBC
OPIATES UR QL SCN: NEGATIVE
OSMOLALITY SERPL CALC.SUM OF ELEC: 268.7 MOSMOL/KG (ref 275–300)
OXYCODONE: NEGATIVE
PCP UR QL SCN: NEGATIVE
PHOSPHATE SERPL-MCNC: 3.5 MG/DL (ref 2.4–4.7)
PLATELET # BLD AUTO: 163 THOU/MM3 (ref 130–400)
PLATELET BLD QL SMEAR: ADEQUATE
PMV BLD AUTO: 10.4 FL (ref 9.4–12.4)
POTASSIUM SERPL-SCNC: 4.2 MEQ/L (ref 3.5–5.2)
PROT SERPL-MCNC: 5.9 G/DL (ref 6.1–8)
RBC # BLD AUTO: 4.06 MILL/MM3 (ref 4.7–6.1)
SCAN OF BLOOD SMEAR: NORMAL
SODIUM SERPL-SCNC: 134 MEQ/L (ref 135–145)
VARIANT LYMPHS BLD QL SMEAR: ABNORMAL %
WBC # BLD AUTO: 5.9 THOU/MM3 (ref 4.8–10.8)

## 2024-09-08 PROCEDURE — 1200000003 HC TELEMETRY R&B

## 2024-09-08 PROCEDURE — 80053 COMPREHEN METABOLIC PANEL: CPT

## 2024-09-08 PROCEDURE — 85610 PROTHROMBIN TIME: CPT

## 2024-09-08 PROCEDURE — 99232 SBSQ HOSP IP/OBS MODERATE 35: CPT | Performed by: NURSE PRACTITIONER

## 2024-09-08 PROCEDURE — 85730 THROMBOPLASTIN TIME PARTIAL: CPT

## 2024-09-08 PROCEDURE — 6360000002 HC RX W HCPCS

## 2024-09-08 PROCEDURE — 84100 ASSAY OF PHOSPHORUS: CPT

## 2024-09-08 PROCEDURE — 2580000003 HC RX 258

## 2024-09-08 PROCEDURE — 82330 ASSAY OF CALCIUM: CPT

## 2024-09-08 PROCEDURE — 36415 COLL VENOUS BLD VENIPUNCTURE: CPT

## 2024-09-08 PROCEDURE — 6360000002 HC RX W HCPCS: Performed by: NURSE PRACTITIONER

## 2024-09-08 PROCEDURE — 80074 ACUTE HEPATITIS PANEL: CPT

## 2024-09-08 PROCEDURE — 6370000000 HC RX 637 (ALT 250 FOR IP)

## 2024-09-08 PROCEDURE — 85025 COMPLETE CBC W/AUTO DIFF WBC: CPT

## 2024-09-08 PROCEDURE — 80307 DRUG TEST PRSMV CHEM ANLYZR: CPT

## 2024-09-08 PROCEDURE — 86695 HERPES SIMPLEX TYPE 1 TEST: CPT

## 2024-09-08 RX ADMIN — LORAZEPAM 4 MG: 2 INJECTION INTRAMUSCULAR; INTRAVENOUS at 17:11

## 2024-09-08 RX ADMIN — SODIUM CHLORIDE, PRESERVATIVE FREE 10 ML: 5 INJECTION INTRAVENOUS at 09:53

## 2024-09-08 RX ADMIN — ENOXAPARIN SODIUM 40 MG: 100 INJECTION SUBCUTANEOUS at 09:53

## 2024-09-08 RX ADMIN — Medication 100 MG: at 09:56

## 2024-09-08 RX ADMIN — Medication 1 TABLET: at 09:56

## 2024-09-08 RX ADMIN — NICOTINE POLACRILEX 2 MG: 2 LOZENGE ORAL at 16:51

## 2024-09-08 RX ADMIN — FOLIC ACID 1 MG: 1 TABLET ORAL at 09:56

## 2024-09-08 ASSESSMENT — PAIN SCALES - WONG BAKER: WONGBAKER_NUMERICALRESPONSE: NO HURT

## 2024-09-09 VITALS
OXYGEN SATURATION: 99 % | DIASTOLIC BLOOD PRESSURE: 80 MMHG | BODY MASS INDEX: 24.49 KG/M2 | WEIGHT: 180.8 LBS | HEIGHT: 72 IN | TEMPERATURE: 98.4 F | RESPIRATION RATE: 18 BRPM | SYSTOLIC BLOOD PRESSURE: 128 MMHG | HEART RATE: 100 BPM

## 2024-09-09 LAB
ANION GAP SERPL CALC-SCNC: 5 MEQ/L (ref 8–16)
BACTERIA BLD AEROBE CULT: NORMAL
BACTERIA BLD AEROBE CULT: NORMAL
BACTERIA UR CULT: NORMAL
BASOPHILS ABSOLUTE: 0.1 THOU/MM3 (ref 0–0.1)
BASOPHILS NFR BLD AUTO: 1.2 %
BUN SERPL-MCNC: 11 MG/DL (ref 7–22)
CALCIUM SERPL-MCNC: 8.5 MG/DL (ref 8.5–10.5)
CHLORIDE SERPL-SCNC: 104 MEQ/L (ref 98–111)
CO2 SERPL-SCNC: 27 MEQ/L (ref 23–33)
CREAT SERPL-MCNC: 0.7 MG/DL (ref 0.4–1.2)
DEPRECATED RDW RBC AUTO: 51.2 FL (ref 35–45)
EOSINOPHIL NFR BLD AUTO: 1.6 %
EOSINOPHILS ABSOLUTE: 0.1 THOU/MM3 (ref 0–0.4)
ERYTHROCYTE [DISTWIDTH] IN BLOOD BY AUTOMATED COUNT: 13.9 % (ref 11.5–14.5)
GFR SERPL CREATININE-BSD FRML MDRD: > 90 ML/MIN/1.73M2
GLUCOSE SERPL-MCNC: 92 MG/DL (ref 70–108)
HCT VFR BLD AUTO: 42.6 % (ref 42–52)
HGB BLD-MCNC: 14.1 GM/DL (ref 14–18)
IMM GRANULOCYTES # BLD AUTO: 0.02 THOU/MM3 (ref 0–0.07)
IMM GRANULOCYTES NFR BLD AUTO: 0.3 %
LYMPHOCYTES ABSOLUTE: 4.4 THOU/MM3 (ref 1–4.8)
LYMPHOCYTES NFR BLD AUTO: 65.9 %
MCH RBC QN AUTO: 32.9 PG (ref 26–33)
MCHC RBC AUTO-ENTMCNC: 33.1 GM/DL (ref 32.2–35.5)
MCV RBC AUTO: 99.5 FL (ref 80–94)
MONOCYTES ABSOLUTE: 0.3 THOU/MM3 (ref 0.4–1.3)
MONOCYTES NFR BLD AUTO: 4.8 %
NEUTROPHILS ABSOLUTE: 1.8 THOU/MM3 (ref 1.8–7.7)
NEUTROPHILS NFR BLD AUTO: 26.2 %
NRBC BLD AUTO-RTO: 0 /100 WBC
PLATELET # BLD AUTO: 179 THOU/MM3 (ref 130–400)
PLATELET BLD QL SMEAR: ADEQUATE
PMV BLD AUTO: 10.1 FL (ref 9.4–12.4)
POTASSIUM SERPL-SCNC: 5.5 MEQ/L (ref 3.5–5.2)
RBC # BLD AUTO: 4.28 MILL/MM3 (ref 4.7–6.1)
SCAN OF BLOOD SMEAR: NORMAL
SODIUM SERPL-SCNC: 136 MEQ/L (ref 135–145)
VARIANT LYMPHS BLD QL SMEAR: ABNORMAL %
WBC # BLD AUTO: 6.7 THOU/MM3 (ref 4.8–10.8)

## 2024-09-09 PROCEDURE — 85025 COMPLETE CBC W/AUTO DIFF WBC: CPT

## 2024-09-09 PROCEDURE — 6360000002 HC RX W HCPCS: Performed by: NURSE PRACTITIONER

## 2024-09-09 PROCEDURE — 6370000000 HC RX 637 (ALT 250 FOR IP)

## 2024-09-09 PROCEDURE — 80048 BASIC METABOLIC PNL TOTAL CA: CPT

## 2024-09-09 PROCEDURE — 36415 COLL VENOUS BLD VENIPUNCTURE: CPT

## 2024-09-09 PROCEDURE — 6360000002 HC RX W HCPCS

## 2024-09-09 PROCEDURE — 99232 SBSQ HOSP IP/OBS MODERATE 35: CPT | Performed by: NURSE PRACTITIONER

## 2024-09-09 PROCEDURE — 2580000003 HC RX 258

## 2024-09-09 RX ADMIN — FOLIC ACID 1 MG: 1 TABLET ORAL at 09:35

## 2024-09-09 RX ADMIN — NICOTINE POLACRILEX 2 MG: 2 LOZENGE ORAL at 14:12

## 2024-09-09 RX ADMIN — Medication 1 TABLET: at 09:35

## 2024-09-09 RX ADMIN — Medication 100 MG: at 09:35

## 2024-09-09 RX ADMIN — LORAZEPAM 2 MG: 2 INJECTION INTRAMUSCULAR; INTRAVENOUS at 09:40

## 2024-09-09 RX ADMIN — LORAZEPAM 3 MG: 2 INJECTION INTRAMUSCULAR; INTRAVENOUS at 15:40

## 2024-09-09 RX ADMIN — ENOXAPARIN SODIUM 40 MG: 100 INJECTION SUBCUTANEOUS at 09:36

## 2024-09-09 RX ADMIN — SODIUM CHLORIDE, PRESERVATIVE FREE 10 ML: 5 INJECTION INTRAVENOUS at 09:36

## 2024-09-09 ASSESSMENT — PAIN DESCRIPTION - DESCRIPTORS: DESCRIPTORS: ACHING

## 2024-09-09 ASSESSMENT — PAIN DESCRIPTION - ORIENTATION: ORIENTATION: LOWER

## 2024-09-09 ASSESSMENT — PAIN SCALES - GENERAL: PAINLEVEL_OUTOF10: 6

## 2024-09-09 ASSESSMENT — PAIN - FUNCTIONAL ASSESSMENT: PAIN_FUNCTIONAL_ASSESSMENT: ACTIVITIES ARE NOT PREVENTED

## 2024-09-09 ASSESSMENT — PAIN DESCRIPTION - LOCATION: LOCATION: BACK

## 2024-09-09 ASSESSMENT — PAIN SCALES - WONG BAKER: WONGBAKER_NUMERICALRESPONSE: NO HURT

## 2024-09-09 ASSESSMENT — PAIN DESCRIPTION - PAIN TYPE: TYPE: CHRONIC PAIN

## 2024-09-09 ASSESSMENT — PAIN DESCRIPTION - FREQUENCY: FREQUENCY: CONTINUOUS

## 2024-09-11 ENCOUNTER — HOSPITAL ENCOUNTER (EMERGENCY)
Age: 39
Discharge: HOME | End: 2024-09-11

## 2024-09-11 VITALS
HEIGHT: 72 IN | HEART RATE: 91 BPM | TEMPERATURE: 97.8 F | BODY MASS INDEX: 24.38 KG/M2 | SYSTOLIC BLOOD PRESSURE: 139 MMHG | RESPIRATION RATE: 16 BRPM | WEIGHT: 180 LBS | DIASTOLIC BLOOD PRESSURE: 85 MMHG | OXYGEN SATURATION: 100 %

## 2024-09-11 DIAGNOSIS — F10.90 ALCOHOL USE DISORDER: ICD-10-CM

## 2024-09-11 DIAGNOSIS — F14.220: Primary | ICD-10-CM

## 2024-09-11 LAB
ALBUMIN SERPL BCG-MCNC: 4.2 G/DL (ref 3.5–5.1)
ALP SERPL-CCNC: 277 U/L (ref 38–126)
ALT SERPL W/O P-5'-P-CCNC: 170 U/L (ref 11–66)
ANION GAP SERPL CALC-SCNC: 12 MEQ/L (ref 8–16)
APAP SERPL-MCNC: < 5 UG/ML (ref 0–20)
AST SERPL-CCNC: 123 U/L (ref 5–40)
BASOPHILS ABSOLUTE: 0.1 THOU/MM3 (ref 0–0.1)
BASOPHILS NFR BLD AUTO: 1.9 %
BILIRUB SERPL-MCNC: 0.4 MG/DL (ref 0.3–1.2)
BUN SERPL-MCNC: 9 MG/DL (ref 7–22)
CALCIUM SERPL-MCNC: 8.9 MG/DL (ref 8.5–10.5)
CHLORIDE SERPL-SCNC: 96 MEQ/L (ref 98–111)
CK SERPL-CCNC: 165 U/L (ref 55–170)
CO2 SERPL-SCNC: 25 MEQ/L (ref 23–33)
CREAT SERPL-MCNC: 0.7 MG/DL (ref 0.4–1.2)
DEPRECATED RDW RBC AUTO: 50.9 FL (ref 35–45)
EKG ATRIAL RATE: 91 BPM
EKG P AXIS: 60 DEGREES
EKG P-R INTERVAL: 142 MS
EKG Q-T INTERVAL: 386 MS
EKG QRS DURATION: 90 MS
EKG QTC CALCULATION (BAZETT): 474 MS
EKG R AXIS: 68 DEGREES
EKG T AXIS: 56 DEGREES
EKG VENTRICULAR RATE: 91 BPM
EOSINOPHIL NFR BLD AUTO: 2.1 %
EOSINOPHILS ABSOLUTE: 0.2 THOU/MM3 (ref 0–0.4)
ERYTHROCYTE [DISTWIDTH] IN BLOOD BY AUTOMATED COUNT: 13.8 % (ref 11.5–14.5)
ETHANOL SERPL-MCNC: 0.03 % (ref 0–0.08)
GFR SERPL CREATININE-BSD FRML MDRD: > 90 ML/MIN/1.73M2
GLUCOSE SERPL-MCNC: 147 MG/DL (ref 70–108)
HCT VFR BLD AUTO: 41.1 % (ref 42–52)
HGB BLD-MCNC: 13.4 GM/DL (ref 14–18)
HSV1 GG IGG SER-ACNC: 13.8 IV
IMM GRANULOCYTES # BLD AUTO: 0.01 THOU/MM3 (ref 0–0.07)
IMM GRANULOCYTES NFR BLD AUTO: 0.1 %
LIPASE SERPL-CCNC: 28.6 U/L (ref 5.6–51.3)
LYMPHOCYTES ABSOLUTE: 4.4 THOU/MM3 (ref 1–4.8)
LYMPHOCYTES NFR BLD AUTO: 60 %
MCH RBC QN AUTO: 32.8 PG (ref 26–33)
MCHC RBC AUTO-ENTMCNC: 32.6 GM/DL (ref 32.2–35.5)
MCV RBC AUTO: 100.5 FL (ref 80–94)
MONOCYTES ABSOLUTE: 0.4 THOU/MM3 (ref 0.4–1.3)
MONOCYTES NFR BLD AUTO: 5.9 %
NEUTROPHILS ABSOLUTE: 2.2 THOU/MM3 (ref 1.8–7.7)
NEUTROPHILS NFR BLD AUTO: 30 %
NRBC BLD AUTO-RTO: 0 /100 WBC
OSMOLALITY SERPL CALC.SUM OF ELEC: 267.8 MOSMOL/KG (ref 275–300)
PLATELET # BLD AUTO: 238 THOU/MM3 (ref 130–400)
PLATELET BLD QL SMEAR: ADEQUATE
PMV BLD AUTO: 10 FL (ref 9.4–12.4)
POTASSIUM SERPL-SCNC: 5 MEQ/L (ref 3.5–5.2)
PROT SERPL-MCNC: 7.5 G/DL (ref 6.1–8)
RBC # BLD AUTO: 4.09 MILL/MM3 (ref 4.7–6.1)
SALICYLATES SERPL-MCNC: < 0.3 MG/DL (ref 2–10)
SCAN OF BLOOD SMEAR: NORMAL
SODIUM SERPL-SCNC: 133 MEQ/L (ref 135–145)
TROPONIN, HIGH SENSITIVITY: < 6 NG/L (ref 0–12)
VARIANT LYMPHS BLD QL SMEAR: ABNORMAL %
WBC # BLD AUTO: 7.3 THOU/MM3 (ref 4.8–10.8)

## 2024-09-11 PROCEDURE — 82550 ASSAY OF CK (CPK): CPT

## 2024-09-11 PROCEDURE — 99284 EMERGENCY DEPT VISIT MOD MDM: CPT

## 2024-09-11 PROCEDURE — 85025 COMPLETE CBC W/AUTO DIFF WBC: CPT

## 2024-09-11 PROCEDURE — 82077 ASSAY SPEC XCP UR&BREATH IA: CPT

## 2024-09-11 PROCEDURE — 80053 COMPREHEN METABOLIC PANEL: CPT

## 2024-09-11 PROCEDURE — 83690 ASSAY OF LIPASE: CPT

## 2024-09-11 PROCEDURE — 93010 ELECTROCARDIOGRAM REPORT: CPT | Performed by: NUCLEAR MEDICINE

## 2024-09-11 PROCEDURE — 80143 DRUG ASSAY ACETAMINOPHEN: CPT

## 2024-09-11 PROCEDURE — 84484 ASSAY OF TROPONIN QUANT: CPT

## 2024-09-11 PROCEDURE — 80179 DRUG ASSAY SALICYLATE: CPT

## 2024-09-11 PROCEDURE — 36415 COLL VENOUS BLD VENIPUNCTURE: CPT

## 2024-09-11 PROCEDURE — 93005 ELECTROCARDIOGRAM TRACING: CPT | Performed by: PHYSICIAN ASSISTANT

## 2024-09-11 ASSESSMENT — PAIN - FUNCTIONAL ASSESSMENT: PAIN_FUNCTIONAL_ASSESSMENT: 0-10

## 2024-09-11 ASSESSMENT — PAIN SCALES - GENERAL: PAINLEVEL_OUTOF10: 7

## 2024-09-11 ASSESSMENT — PAIN DESCRIPTION - LOCATION: LOCATION: GENERALIZED

## 2024-10-05 ENCOUNTER — APPOINTMENT (OUTPATIENT)
Dept: GENERAL RADIOLOGY | Age: 39
End: 2024-10-05

## 2024-10-05 ENCOUNTER — HOSPITAL ENCOUNTER (EMERGENCY)
Age: 39
Discharge: HOME | End: 2024-10-05
Attending: STUDENT IN AN ORGANIZED HEALTH CARE EDUCATION/TRAINING PROGRAM

## 2024-10-05 VITALS
SYSTOLIC BLOOD PRESSURE: 156 MMHG | OXYGEN SATURATION: 95 % | TEMPERATURE: 97.7 F | WEIGHT: 180 LBS | HEART RATE: 80 BPM | RESPIRATION RATE: 14 BRPM | DIASTOLIC BLOOD PRESSURE: 95 MMHG | BODY MASS INDEX: 24.41 KG/M2

## 2024-10-05 DIAGNOSIS — S63.502A SPRAIN OF LEFT WRIST, INITIAL ENCOUNTER: Primary | ICD-10-CM

## 2024-10-05 PROCEDURE — 99283 EMERGENCY DEPT VISIT LOW MDM: CPT

## 2024-10-05 PROCEDURE — 73110 X-RAY EXAM OF WRIST: CPT

## 2024-10-05 PROCEDURE — 73030 X-RAY EXAM OF SHOULDER: CPT

## 2024-10-05 PROCEDURE — 6370000000 HC RX 637 (ALT 250 FOR IP): Performed by: STUDENT IN AN ORGANIZED HEALTH CARE EDUCATION/TRAINING PROGRAM

## 2024-10-05 RX ORDER — HYDROCODONE BITARTRATE AND ACETAMINOPHEN 5; 325 MG/1; MG/1
1 TABLET ORAL ONCE
Status: COMPLETED | OUTPATIENT
Start: 2024-10-05 | End: 2024-10-05

## 2024-10-05 RX ADMIN — HYDROCODONE BITARTRATE AND ACETAMINOPHEN 1 TABLET: 5; 325 TABLET ORAL at 19:16

## 2024-10-05 ASSESSMENT — ENCOUNTER SYMPTOMS
WHEEZING: 0
NAUSEA: 0
VOMITING: 0
EYE REDNESS: 0
COUGH: 0
SINUS PRESSURE: 0
ABDOMINAL PAIN: 0
EYE PAIN: 0
DIARRHEA: 0
BACK PAIN: 0
SHORTNESS OF BREATH: 0
EYE DISCHARGE: 0
SORE THROAT: 0

## 2024-10-05 ASSESSMENT — PAIN DESCRIPTION - DESCRIPTORS: DESCRIPTORS: BURNING;DISCOMFORT;PINS AND NEEDLES

## 2024-10-05 ASSESSMENT — PAIN SCALES - GENERAL
PAINLEVEL_OUTOF10: 10
PAINLEVEL_OUTOF10: 10

## 2024-10-05 ASSESSMENT — PAIN - FUNCTIONAL ASSESSMENT
PAIN_FUNCTIONAL_ASSESSMENT: 0-10
PAIN_FUNCTIONAL_ASSESSMENT: PREVENTS OR INTERFERES SOME ACTIVE ACTIVITIES AND ADLS

## 2024-10-05 ASSESSMENT — PAIN DESCRIPTION - ORIENTATION
ORIENTATION: LEFT
ORIENTATION: LEFT

## 2024-10-05 ASSESSMENT — PAIN DESCRIPTION - PAIN TYPE: TYPE: ACUTE PAIN

## 2024-10-05 ASSESSMENT — PAIN DESCRIPTION - LOCATION
LOCATION: HAND;WRIST;SHOULDER
LOCATION: HAND;WRIST

## 2024-10-05 ASSESSMENT — PAIN DESCRIPTION - FREQUENCY: FREQUENCY: CONTINUOUS

## 2024-10-05 ASSESSMENT — PAIN DESCRIPTION - ONSET: ONSET: ON-GOING

## 2024-10-05 NOTE — ED PROVIDER NOTES
digits.  Ulnar radial median nerve all intact.  No elbow tenderness bilaterally.  No spinal tenderness noted anywhere.  Full range of motion to bilateral shoulders.  No clavicular tenderness.   Skin:     General: Skin is warm and dry.   Neurological:      Mental Status: He is alert and oriented to person, place, and time.      Cranial Nerves: No cranial nerve deficit.      Coordination: Coordination normal.          Procedures     MDM  Patient presents to emergency department status post fall.  He has tenderness to the left wrist.  He is neurovascularly intact without any scaphoid bone tenderness.  X-ray imaging obtained of the left wrist, no fracture or dislocation noted.  Patient does feel better after pain medication and icing it while here.  X-ray of the left shoulder is negative for any fracture, possible low-grade separation injury of the clavicle.  He does however have good range of motion.  He was provided with a Velcro wrist splint for support.  Stable for discharge, he was given orthopedic follow-up if symptoms still persisting in the next 7 to 10 days.  He is comfortable with plan.  Neurovascular intact after splint application which was applied by nursing staff         --------------------------------------------- PAST HISTORY ---------------------------------------------  Past Medical History:  has a past medical history of Alcohol abuse, Chronic allergic rhinitis, Drug abuse ([facility]), and Major depressive disorder, single episode, severe with anxious distress ([facility]).    Past Surgical History:  has a past surgical history that includes Anterior cruciate ligament repair; Skin graft; Tibia fracture surgery; [place] tooth extraction; Finger surgery (01/10/2015); and knee surgery.    Social History:  reports that he has been smoking cigarettes. He started smoking about 9 months ago. He has a 1.1 pack-year smoking history. He has never used smokeless tobacco. He reports current alcohol use. He reports current  drug use. Drugs: Marijuana (Weed) and Cocaine.    Family History: family history includes No Known Problems in his brother; Other (age of onset: 63) in his father; Sleep Apnea in his mother.     The patient’s home medications have been reviewed.    Allergies: Patient has no known allergies.    -------------------------------------------------- RESULTS -------------------------------------------------  Labs:  No results found for this visit on 10/05/24.    Radiology:  XR WRIST LEFT (MIN 3 VIEWS)   Final Result   No acute fracture.         XR SHOULDER LEFT (MIN 2 VIEWS)   Final Result   Slight distal clavicle elevation may be projectional or low-grade separation   injury, advise correlation with clinical findings.             ------------------------- NURSING NOTES AND VITALS REVIEWED ---------------------------  Date / Time Roomed:  10/5/2024  7:01 PM  ED Bed Assignment:  05/05    The nursing notes within the ED encounter and vital signs as below have been reviewed.   BP (!) 156/95   Pulse 80   Temp 97.7 °F (36.5 °C) (Oral)   Resp 14   Wt 81.6 kg (180 lb)   SpO2 95%   BMI 24.41 kg/m²   Oxygen Saturation Interpretation: Normal      --------------------------------- ADDITIONAL PROVIDER NOTES ---------------------------------  At this time the patient is without objective evidence of an acute process requiring hospitalization or inpatient management.  They have remained hemodynamically stable throughout their entire ED visit and are stable for discharge with outpatient follow-up.     The plan has been discussed in detail and they are aware of the specific conditions for emergent return, as well as the importance of follow-up.      New Prescriptions    No medications on file       Diagnosis:  1. Sprain of left wrist, initial encounter        Disposition:  Patient's disposition: Discharge to home  Patient's condition is stable.            Pb Jason DO  10/05/24 2012

## 2024-10-06 NOTE — DISCHARGE INSTRUCTIONS
Follow-up with orthopedic surgery if symptoms still persisting in the next 7 to 10 days you may use Tylenol or ibuprofen for pain

## 2024-10-13 ENCOUNTER — HOSPITAL ENCOUNTER (EMERGENCY)
Age: 39
Discharge: ANOTHER HOSPITAL | End: 2024-10-13
Attending: EMERGENCY MEDICINE
Payer: MEDICAID

## 2024-10-13 ENCOUNTER — APPOINTMENT (OUTPATIENT)
Dept: ULTRASOUND IMAGING | Age: 39
End: 2024-10-13
Payer: MEDICAID

## 2024-10-13 VITALS
HEART RATE: 75 BPM | DIASTOLIC BLOOD PRESSURE: 105 MMHG | RESPIRATION RATE: 18 BRPM | TEMPERATURE: 97.6 F | SYSTOLIC BLOOD PRESSURE: 159 MMHG | OXYGEN SATURATION: 99 %

## 2024-10-13 DIAGNOSIS — R10.31 RIGHT GROIN PAIN: Primary | ICD-10-CM

## 2024-10-13 DIAGNOSIS — N43.3 HYDROCELE, UNSPECIFIED HYDROCELE TYPE: ICD-10-CM

## 2024-10-13 LAB
ALBUMIN SERPL-MCNC: 4.1 G/DL (ref 3.5–5.2)
ALP SERPL-CCNC: 61 U/L (ref 40–129)
ALT SERPL-CCNC: 38 U/L (ref 0–40)
ANION GAP SERPL CALCULATED.3IONS-SCNC: 11 MMOL/L (ref 7–16)
AST SERPL-CCNC: 21 U/L (ref 0–39)
BASOPHILS # BLD: 0.02 K/UL (ref 0–0.2)
BASOPHILS NFR BLD: 0 % (ref 0–2)
BILIRUB SERPL-MCNC: 0.2 MG/DL (ref 0–1.2)
BILIRUB UR QL STRIP: NEGATIVE
BUN SERPL-MCNC: 18 MG/DL (ref 6–20)
CALCIUM SERPL-MCNC: 8.8 MG/DL (ref 8.6–10.2)
CHLORIDE SERPL-SCNC: 109 MMOL/L (ref 98–107)
CLARITY UR: CLEAR
CO2 SERPL-SCNC: 20 MMOL/L (ref 22–29)
COLOR UR: YELLOW
COMMENT: ABNORMAL
CREAT SERPL-MCNC: 0.9 MG/DL (ref 0.7–1.2)
EOSINOPHIL # BLD: 0.04 K/UL (ref 0.05–0.5)
EOSINOPHILS RELATIVE PERCENT: 1 % (ref 0–6)
ERYTHROCYTE [DISTWIDTH] IN BLOOD BY AUTOMATED COUNT: 13.6 % (ref 11.5–15)
GFR, ESTIMATED: >90 ML/MIN/1.73M2
GLUCOSE SERPL-MCNC: 118 MG/DL (ref 74–99)
GLUCOSE UR STRIP-MCNC: NEGATIVE MG/DL
HCT VFR BLD AUTO: 34.6 % (ref 37–54)
HGB BLD-MCNC: 11.5 G/DL (ref 12.5–16.5)
HGB UR QL STRIP.AUTO: NEGATIVE
IMM GRANULOCYTES # BLD AUTO: <0.03 K/UL (ref 0–0.58)
IMM GRANULOCYTES NFR BLD: 0 % (ref 0–5)
KETONES UR STRIP-MCNC: NEGATIVE MG/DL
LACTATE BLDV-SCNC: 1.3 MMOL/L (ref 0.5–2.2)
LEUKOCYTE ESTERASE UR QL STRIP: NEGATIVE
LYMPHOCYTES NFR BLD: 1.44 K/UL (ref 1.5–4)
LYMPHOCYTES RELATIVE PERCENT: 24 % (ref 20–42)
MCH RBC QN AUTO: 31.8 PG (ref 26–35)
MCHC RBC AUTO-ENTMCNC: 33.2 G/DL (ref 32–34.5)
MCV RBC AUTO: 95.6 FL (ref 80–99.9)
MONOCYTES NFR BLD: 0.17 K/UL (ref 0.1–0.95)
MONOCYTES NFR BLD: 3 % (ref 2–12)
NEUTROPHILS NFR BLD: 72 % (ref 43–80)
NEUTS SEG NFR BLD: 4.3 K/UL (ref 1.8–7.3)
NITRITE UR QL STRIP: NEGATIVE
PH UR STRIP: 6 [PH] (ref 5–9)
PLATELET # BLD AUTO: 220 K/UL (ref 130–450)
PMV BLD AUTO: 10.2 FL (ref 7–12)
POTASSIUM SERPL-SCNC: 4.2 MMOL/L (ref 3.5–5)
PROT SERPL-MCNC: 6.8 G/DL (ref 6.4–8.3)
PROT UR STRIP-MCNC: NEGATIVE MG/DL
RBC # BLD AUTO: 3.62 M/UL (ref 3.8–5.8)
SODIUM SERPL-SCNC: 140 MMOL/L (ref 132–146)
SP GR UR STRIP: <1.005 (ref 1–1.03)
UROBILINOGEN UR STRIP-ACNC: 0.2 EU/DL (ref 0–1)
WBC OTHER # BLD: 6 K/UL (ref 4.5–11.5)

## 2024-10-13 PROCEDURE — 81003 URINALYSIS AUTO W/O SCOPE: CPT

## 2024-10-13 PROCEDURE — 83605 ASSAY OF LACTIC ACID: CPT

## 2024-10-13 PROCEDURE — 76870 US EXAM SCROTUM: CPT

## 2024-10-13 PROCEDURE — 96374 THER/PROPH/DIAG INJ IV PUSH: CPT

## 2024-10-13 PROCEDURE — 80053 COMPREHEN METABOLIC PANEL: CPT

## 2024-10-13 PROCEDURE — 85025 COMPLETE CBC W/AUTO DIFF WBC: CPT

## 2024-10-13 PROCEDURE — 6360000002 HC RX W HCPCS: Performed by: EMERGENCY MEDICINE

## 2024-10-13 PROCEDURE — 99284 EMERGENCY DEPT VISIT MOD MDM: CPT

## 2024-10-13 PROCEDURE — 6370000000 HC RX 637 (ALT 250 FOR IP): Performed by: EMERGENCY MEDICINE

## 2024-10-13 RX ORDER — IBUPROFEN 800 MG/1
800 TABLET, FILM COATED ORAL ONCE
Status: COMPLETED | OUTPATIENT
Start: 2024-10-13 | End: 2024-10-13

## 2024-10-13 RX ORDER — KETOROLAC TROMETHAMINE 30 MG/ML
30 INJECTION, SOLUTION INTRAMUSCULAR; INTRAVENOUS ONCE
Status: COMPLETED | OUTPATIENT
Start: 2024-10-13 | End: 2024-10-13

## 2024-10-13 RX ORDER — IBUPROFEN 800 MG/1
800 TABLET, FILM COATED ORAL EVERY 8 HOURS PRN
Qty: 21 TABLET | Refills: 0 | Status: SHIPPED | OUTPATIENT
Start: 2024-10-13 | End: 2024-10-20

## 2024-10-13 RX ORDER — ACETAMINOPHEN 325 MG/1
650 TABLET ORAL ONCE
Status: COMPLETED | OUTPATIENT
Start: 2024-10-13 | End: 2024-10-13

## 2024-10-13 RX ORDER — OXYCODONE AND ACETAMINOPHEN 5; 325 MG/1; MG/1
1 TABLET ORAL ONCE
Status: COMPLETED | OUTPATIENT
Start: 2024-10-13 | End: 2024-10-13

## 2024-10-13 RX ADMIN — ACETAMINOPHEN 650 MG: 325 TABLET ORAL at 13:46

## 2024-10-13 RX ADMIN — OXYCODONE HYDROCHLORIDE AND ACETAMINOPHEN 1 TABLET: 5; 325 TABLET ORAL at 17:22

## 2024-10-13 RX ADMIN — IBUPROFEN 800 MG: 800 TABLET, FILM COATED ORAL at 17:22

## 2024-10-13 RX ADMIN — KETOROLAC TROMETHAMINE 30 MG: 30 INJECTION, SOLUTION INTRAMUSCULAR at 13:47

## 2024-10-13 ASSESSMENT — PAIN SCALES - GENERAL
PAINLEVEL_OUTOF10: 5
PAINLEVEL_OUTOF10: 7

## 2024-10-13 ASSESSMENT — LIFESTYLE VARIABLES
HOW MANY STANDARD DRINKS CONTAINING ALCOHOL DO YOU HAVE ON A TYPICAL DAY: PATIENT DOES NOT DRINK
HOW OFTEN DO YOU HAVE A DRINK CONTAINING ALCOHOL: NEVER

## 2024-10-13 ASSESSMENT — PAIN DESCRIPTION - LOCATION
LOCATION: GROIN
LOCATION: GROIN

## 2024-10-13 NOTE — ED NOTES
Report given to , RN from [facility].   Report method by phone   The following was reviewed with receiving RN:   Current vital signs:  BP (!) 159/105   Pulse 75   Temp 97.6 °F (36.4 °C)   Resp 18   SpO2 99%                      Any medication or safety alerts were reviewed. Any pending diagnostics and notifications were also reviewed, as well as any safety concerns or issues, abnormal labs, abnormal imaging, and abnormal assessment findings. Questions were answered.

## 2024-10-13 NOTE — ED PROVIDER NOTES
your urologist or the on-call urologist      DISCHARGE MEDICATIONS:  Discharge Medication List as of 10/13/2024  5:28 PM          DISCONTINUED MEDICATIONS:  Discharge Medication List as of 10/13/2024  5:28 PM                 (Please note that portions of this note were completed with a voice recognition program.  Efforts were made to edit the dictations but occasionally words are mis-transcribed.)    Nazario Randall DO (electronically signed)           Nazario Randall,   10/13/24 2509

## 2024-10-13 NOTE — DISCHARGE INSTRUCTIONS
US SCROTUM AND TESTICLES   Final Result   1. No evidence of testicular torsion or mass.   2. Mild to moderate bilateral hydroceles.                  Learning About Hydrocele   What is hydrocele?     A hydrocele (say \"IY-snct-oega\") is a buildup of watery fluid around one or both testicles. It causes the scrotum or groin area to swell.  Many baby boys are born with this condition. The swelling it causes may look scary, but it is usually not a problem.    What causes it?  A month or so before birth, a baby's testicles move from the belly area down into the scrotum, along with a bit of the lining of the belly area. The lining shrivels up, leaving a small empty space around the testicles. This space normally closes up by the time a baby is 2 years old.  Sometimes fluid leaks into the space, filling it like a small water balloon. This is a hydrocele. There are different kinds:  Noncommunicating hydrocele. This happens when the space closes up and traps the fluid inside. Usually the body absorbs the fluid over time.  Communicating hydrocele. This happens when the space does not close up the way it should, and the fluid moves back and forth between the scrotum and the belly area. The swelling comes and goes. This problem is usually fixed with surgery to help prevent a hernia in the groin.  Hydrocele of the spermatic cord. This type is located higher up in the scrotum. The fluid is usually absorbed within a few months and at the latest by age 1 or 2.  What are the symptoms?  The usual symptom is a swollen scrotum. The swelling does not hurt. If your child seems to be in pain, call the doctor. Pain may mean that your child has a hernia or another problem.  How is it treated?  Most of the time, all you need to do is watch for any changes in the swelling. If the swelling gets bigger or if it comes and goes, tell your doctor.  Your child may need surgery if:  He still has the hydrocele at age 1.  The swelling comes and

## 2024-10-21 ENCOUNTER — HOSPITAL ENCOUNTER (OUTPATIENT)
Dept: PSYCHIATRY | Age: 39
Setting detail: THERAPIES SERIES
Discharge: HOME | End: 2024-10-21

## 2024-10-21 ENCOUNTER — OFFICE VISIT (OUTPATIENT)
Dept: FAMILY MEDICINE CLINIC | Age: 39
End: 2024-10-21
Payer: MEDICAID

## 2024-10-21 ENCOUNTER — TELEPHONE (OUTPATIENT)
Dept: FAMILY MEDICINE CLINIC | Age: 39
End: 2024-10-21

## 2024-10-21 VITALS
SYSTOLIC BLOOD PRESSURE: 136 MMHG | OXYGEN SATURATION: 98 % | BODY MASS INDEX: 24.92 KG/M2 | HEART RATE: 93 BPM | RESPIRATION RATE: 16 BRPM | TEMPERATURE: 97.9 F | HEIGHT: 72 IN | WEIGHT: 184 LBS | DIASTOLIC BLOOD PRESSURE: 72 MMHG

## 2024-10-21 DIAGNOSIS — Z09 HOSPITAL DISCHARGE FOLLOW-UP: Primary | ICD-10-CM

## 2024-10-21 DIAGNOSIS — R79.89 LOW TESTOSTERONE IN MALE: ICD-10-CM

## 2024-10-21 DIAGNOSIS — R10.31 RIGHT GROIN PAIN: ICD-10-CM

## 2024-10-21 DIAGNOSIS — G47.00 INSOMNIA, UNSPECIFIED TYPE: ICD-10-CM

## 2024-10-21 DIAGNOSIS — M67.441 GANGLION CYST OF FINGER OF RIGHT HAND: ICD-10-CM

## 2024-10-21 PROCEDURE — 99214 OFFICE O/P EST MOD 30 MIN: CPT | Performed by: NURSE PRACTITIONER

## 2024-10-21 PROCEDURE — 5130000000 HC BRIDGE APPOINTMENT

## 2024-10-21 RX ORDER — DOXEPIN HYDROCHLORIDE 25 MG/1
CAPSULE ORAL
COMMUNITY
Start: 2024-10-08 | End: 2024-10-21 | Stop reason: SDUPTHER

## 2024-10-21 RX ORDER — MIRTAZAPINE 15 MG/1
TABLET, FILM COATED ORAL
COMMUNITY
Start: 2024-10-04

## 2024-10-21 RX ORDER — CLONIDINE HYDROCHLORIDE 0.1 MG/1
TABLET ORAL
COMMUNITY
Start: 2024-10-03

## 2024-10-21 RX ORDER — CHLORHEXIDINE GLUCONATE ORAL RINSE 1.2 MG/ML
SOLUTION DENTAL
COMMUNITY
Start: 2024-10-04

## 2024-10-21 RX ORDER — ALBUTEROL SULFATE 90 UG/1
INHALANT RESPIRATORY (INHALATION)
COMMUNITY
Start: 2024-09-19

## 2024-10-21 RX ORDER — DOXEPIN HYDROCHLORIDE 25 MG/1
50 CAPSULE ORAL NIGHTLY
Qty: 60 CAPSULE | Refills: 0 | Status: SHIPPED | OUTPATIENT
Start: 2024-10-21 | End: 2024-11-20

## 2024-10-21 RX ORDER — LISINOPRIL 10 MG/1
TABLET ORAL
COMMUNITY
Start: 2024-10-08

## 2024-10-21 RX ORDER — ARIPIPRAZOLE 10 MG/1
TABLET ORAL
COMMUNITY
Start: 2024-10-08

## 2024-10-21 RX ORDER — TOPIRAMATE 25 MG/1
TABLET, FILM COATED ORAL
COMMUNITY
Start: 2024-10-03

## 2024-10-21 RX ORDER — HYDROXYZINE PAMOATE 25 MG/1
CAPSULE ORAL
COMMUNITY
Start: 2024-10-08

## 2024-10-21 RX ORDER — GLUCOSA SU 2KCL/CHONDROITIN SU 500-400 MG
CAPSULE ORAL
COMMUNITY
Start: 2024-10-03

## 2024-10-21 SDOH — ECONOMIC STABILITY: FOOD INSECURITY: WITHIN THE PAST 12 MONTHS, YOU WORRIED THAT YOUR FOOD WOULD RUN OUT BEFORE YOU GOT MONEY TO BUY MORE.: SOMETIMES TRUE

## 2024-10-21 SDOH — ECONOMIC STABILITY: FOOD INSECURITY: WITHIN THE PAST 12 MONTHS, THE FOOD YOU BOUGHT JUST DIDN'T LAST AND YOU DIDN'T HAVE MONEY TO GET MORE.: SOMETIMES TRUE

## 2024-10-21 SDOH — ECONOMIC STABILITY: INCOME INSECURITY: HOW HARD IS IT FOR YOU TO PAY FOR THE VERY BASICS LIKE FOOD, HOUSING, MEDICAL CARE, AND HEATING?: SOMEWHAT HARD

## 2024-10-21 ASSESSMENT — ENCOUNTER SYMPTOMS
TROUBLE SWALLOWING: 0
DIARRHEA: 0
VOMITING: 0
SHORTNESS OF BREATH: 0
ABDOMINAL PAIN: 0
NAUSEA: 0
EYE DISCHARGE: 0
EYE REDNESS: 0
BACK PAIN: 0
EYE PAIN: 0
ALLERGIC/IMMUNOLOGIC NEGATIVE: 1
WHEEZING: 0
RHINORRHEA: 0
COUGH: 0
SORE THROAT: 0
CONSTIPATION: 0

## 2024-10-21 ASSESSMENT — PATIENT HEALTH QUESTIONNAIRE - PHQ9
SUM OF ALL RESPONSES TO PHQ QUESTIONS 1-9: 15
5. POOR APPETITE OR OVEREATING: SEVERAL DAYS
8. MOVING OR SPEAKING SO SLOWLY THAT OTHER PEOPLE COULD HAVE NOTICED. OR THE OPPOSITE, BEING SO FIGETY OR RESTLESS THAT YOU HAVE BEEN MOVING AROUND A LOT MORE THAN USUAL: NEARLY EVERY DAY
7. TROUBLE CONCENTRATING ON THINGS, SUCH AS READING THE NEWSPAPER OR WATCHING TELEVISION: NEARLY EVERY DAY
1. LITTLE INTEREST OR PLEASURE IN DOING THINGS: SEVERAL DAYS
4. FEELING TIRED OR HAVING LITTLE ENERGY: SEVERAL DAYS
3. TROUBLE FALLING OR STAYING ASLEEP: MORE THAN HALF THE DAYS
SUM OF ALL RESPONSES TO PHQ QUESTIONS 1-9: 15
10. IF YOU CHECKED OFF ANY PROBLEMS, HOW DIFFICULT HAVE THESE PROBLEMS MADE IT FOR YOU TO DO YOUR WORK, TAKE CARE OF THINGS AT HOME, OR GET ALONG WITH OTHER PEOPLE: SOMEWHAT DIFFICULT
SUM OF ALL RESPONSES TO PHQ QUESTIONS 1-9: 15
2. FEELING DOWN, DEPRESSED OR HOPELESS: MORE THAN HALF THE DAYS
9. THOUGHTS THAT YOU WOULD BE BETTER OFF DEAD, OR OF HURTING YOURSELF: NOT AT ALL
6. FEELING BAD ABOUT YOURSELF - OR THAT YOU ARE A FAILURE OR HAVE LET YOURSELF OR YOUR FAMILY DOWN: MORE THAN HALF THE DAYS
SUM OF ALL RESPONSES TO PHQ QUESTIONS 1-9: 15
SUM OF ALL RESPONSES TO PHQ9 QUESTIONS 1 & 2: 3

## 2024-10-21 NOTE — TELEPHONE ENCOUNTER
Patient had testosterone level checked about 10 days ago at [facility]. He will call them and have the results faxed to [facility].

## 2024-10-21 NOTE — TELEPHONE ENCOUNTER
Okay for tests.  Patient did have this done outside of [facility] facility but I guess he will just have to do it again.

## 2024-10-21 NOTE — PROGRESS NOTES
Left eye: No discharge.      Conjunctiva/sclera: Conjunctivae normal.      Pupils: Pupils are equal, round, and reactive to light.   Neck:      Vascular: No JVD.   Cardiovascular:      Rate and Rhythm: Normal rate and regular rhythm.   Pulmonary:      Effort: Pulmonary effort is normal. No respiratory distress.   Musculoskeletal:         General: No tenderness or deformity. Normal range of motion.      Cervical back: Normal range of motion.   Skin:     General: Skin is warm and dry.      Capillary Refill: Capillary refill takes less than 2 seconds.      Coloration: Skin is not pale.      Findings: No erythema or rash.   Neurological:      Mental Status: He is alert and oriented to person, place, and time.      Coordination: Coordination normal.   Psychiatric:         Attention and Perception: Attention and perception normal.         Mood and Affect: Mood is anxious.         Speech: Speech normal.         Behavior: Behavior normal.         Thought Content: Thought content normal.         Cognition and Memory: Cognition and memory normal.         Judgment: Judgment normal.         Assessment/Plan:      Issa was seen today for ed follow-up and finger pain.    Diagnoses and all orders for this visit:    Hospital discharge follow-up    Right groin pain    Insomnia, unspecified type  -     doxepin (SINEQUAN) 25 MG capsule; Take 2 capsules by mouth nightly    Ganglion cyst of finger of right hand    Low testosterone in male  -     [facility]    I declined to give the patient anything for his anxiety as he already has Vistaril and he is on other psychotropics.  He has an upcoming referral for psychiatry and he will have to wait until then.    Return in 4 weeks (on 11/18/2024), or if symptoms worsen or fail to improve.    Patient instructions given margaret.        Electronically signed by JESSICA Christiansen CNP on 10/21/2024 at 1:51 PM

## 2024-10-21 NOTE — PATIENT INSTRUCTIONS
[place] [employer]*  (Call [employer]/211 if need more resources.)        Home Delivered Meals:  [employer] Delivered Meals:  Phone: 1-906.874.2118  [facility] ([place] only):  Phone: [phone]  Moms Meals:  What they offer: Nutritious refrigerated meals. Available to individuals with Medicare advantage plans, Medicaid plans, Long-Term services and supports (LTSS) programs and self-pay at $6.99 a meal.  Renal-Friendly, pureed and Gluten free at $7.99 a meal plus flat rate shipping.   Phone: 1-874.569.8111  Website: https://www.[employer]    Senior Nutrition program:  Phone: 1-220.898.2769  [employer] Home Delivered Meals (From [place]):  What they offer: Meals for seniors, disabled, recovering from an injury or illness on [place] and [facility]. Both privately and through homecare services like [facility] and [facility].   Phone: 1-594.660.9352  Website: https://[employer]    Other Food Resources:  MOW (Meals on Wheels) Contact Area Agency on Aging   Phone: 346.451.3433    [facility]- Nutrition Program   What they offer: Nutrition education and nutritious foods for Pregnant women, women who just had a baby, breastfeeding moms, infants and children up to the age of 5.   Phone: 625.852.9820   Website: https://www.GrabInbox.org  [facility]- Provides Meals  What they offer: Provide food boxes to those in need monthly.  Phone: 628.357.9619  Website: [email or web address]/ministries/[facility]  [employer]-Food/ Clothing   What they offer:  Provides a 3-day supply of food for individuals once a month.   Phone: 506.572.9055  Website: https://[employer].org/author/freddieacup1  [employer]-Meal Site   Phone: 886.601.5028  [employer]- Meal Site  Phone: 1-798.830.8076  [employer]:  What they offer: Provides continental breakfast and hot lunch   Phone: 669-057-6936  Website: https://odbread.org

## 2024-10-21 NOTE — PROGRESS NOTES
Biopsychosocial Assessment Note    Name: Joseph M Litten  Date: 10/21/2024  Start Time: 10:00 AM  End Time: 11:00 AM    Social work met with patient to complete the biopsychosocial assessment and CSSR-S.     Presenting Problem: Joseph Litten is a 39 year old, white male with past history of MDD, PAULINO, Cocaine Use Disorder and Alcohol Use Disorder who presents for SW evaluation for intensive outpatient program. Issa recently completed residential program at [facility]. He has been clean for the last 38 days and would like to continue outpatient treatment to manage his symptoms of anxiety and depression.     Psychosocial High Risk Factors: History of Substance Abuse, History of Trauma    Family/Supports identified: Mother, Brother, Girlfriend    Patient Strengths: Motivated, Supportive Family    Patient Barriers: No Outpatient Psychiatry/Counseling Providers - will need to be established    CMHC/MH history: see below    Patient Report and Notes:  Issa reports a 20-year history of daily cocaine (powder) use and transitioned to crack cocaine approximately two years ago, using $100-$200 worth of crack cocaine per day. His only period of abstinence was in 2019, when he spent six months in prison. In addition to his cocaine use, he has a history of significant alcohol abuse, consuming a 12-pack of beer daily along with 5-6 shots of vodka. He also has a history of daily marijuana use. He has now been clean and sober from all illicit substances, including marijuana, for the past 38 days.    Issa reports experiencing depression and anxiety since a young age but never sought mental health or substance abuse treatment until recently, when he entered [facility]. He is currently taking Abilify, Doxepin, Vistaril, Remeron, and Topamax. Despite his medication regimen, he continues to feel depressed, rating his depression as a 7 out of 10, with 10 being the worst. His anxiety remains difficult to manage, reporting

## 2024-10-21 NOTE — TELEPHONE ENCOUNTER
Natalie from [facility] office called stating they received a referral today with the dx of low testosterone, but pt has not had a testosterone level checked. Natalie states pt will need to have level checked before they will see him.  Testosterone free and total?  Dx ?    Pt will need informed.

## 2024-10-22 ENCOUNTER — TELEMEDICINE (OUTPATIENT)
Dept: PSYCHIATRY | Age: 39
End: 2024-10-22

## 2024-10-22 DIAGNOSIS — F41.9 ANXIETY AND DEPRESSION: Primary | ICD-10-CM

## 2024-10-22 DIAGNOSIS — F32.A ANXIETY AND DEPRESSION: Primary | ICD-10-CM

## 2024-10-22 DIAGNOSIS — F14.10 COCAINE USE DISORDER (HCC): ICD-10-CM

## 2024-10-22 DIAGNOSIS — F33.1 MDD (MAJOR DEPRESSIVE DISORDER), RECURRENT EPISODE, MODERATE (HCC): ICD-10-CM

## 2024-10-22 NOTE — PROGRESS NOTES
Disturbance:  denies any perceptual disturbance  Cognition:  In tact  Memory: age appropriate  Insight & Judgement: fair  Medication side effects:  denies       DSM-5 DIAGNOSIS:      MDD recurrent moderate      Past Medical History:   Diagnosis Date    Alcohol abuse     Chronic allergic rhinitis     Drug abuse (HCC)     Major depressive disorder, single episode, severe with anxious distress (HCC) 12/7/2017          PLAN      Admit to [facility] Intensive Outpatient Program       FOLLOW UP/MED MANAGEMENT        Patient will attend up to three days per week, up to one group service per day, up to one individual therapy session per day, and up to two psychoeducation/RT groups per day.    Patient unable to benefit from a less intensive outpatient program due to requiring more structured environment, and severity of symptoms.  Provider has reasonable expectation that patient will make a timely and significant practical improvement in the presenting acute symptoms as a result of the B[facility]OP because patient displays intent to engage in program and is intellectually capable of processing and subsequently adopting the skills and lessons taught in BHIOP.  Patient would also benefit from more frequent medication management than would get on total outpatient level of care.   History obtained from:  patient, electronic medical record, [facility] treatment team      Behavioral Services  Medicare Certification for Outpatient Psychiatric Services    I certify that this patient's outpatient psychiatric services are medically necessary for:     X (1) Treatment which could reasonably be expected to improve this patient's condition,      X (2) Or for diagnostic study;     AND    X (2) The psychiatric services are provided while the individual is under the care of a physician and are included in the individualized plan of care.    In addition, I certify that this

## 2024-10-25 DIAGNOSIS — R79.89 LOW TESTOSTERONE IN MALE: Primary | ICD-10-CM

## 2024-10-28 ENCOUNTER — LAB (OUTPATIENT)
Dept: LAB | Age: 39
End: 2024-10-28

## 2024-10-28 ENCOUNTER — HOSPITAL ENCOUNTER (OUTPATIENT)
Dept: PSYCHIATRY | Age: 39
Setting detail: THERAPIES SERIES
Discharge: HOME | End: 2024-10-28

## 2024-10-28 DIAGNOSIS — R79.89 LOW TESTOSTERONE IN MALE: ICD-10-CM

## 2024-10-28 LAB — TESTOST SERPL-MCNC: 326 NG/DL (ref 249–836)

## 2024-10-28 NOTE — PROGRESS NOTES
[facility]patient [facility]    Intensive Outpatient Program (IOP) Group Therapy Progress Notes  Program: Intensive Outpatient Program      Group 1:    Date of Group10/23/2024    Start Time: 8:36am  End Time:9 :27am    Number of Participants: 5    Focus/Topic of Group Activity: Check in    Summary of Group Session: Pt checked in and  shared activities and encounters with stressors , use of coping and struggling with coping . Pt also identified goal for today.    Therapeutic Intervention: Empathic listening, Cognitive Behavioral Therapy (CBT), Psycho education,     Patient's Response to Intervention:  Pt was attentive and actively engaged in session. Patient completed check in form and form was reviewed by therapist.  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    Mental Status Exam:   Appearance: Appropriately dressed and groomed  Mood: euthymic  Affect: Full  Behavior: Pleasant, Cooperative, and Interactive; Alert  Speech: Appropriate and Clear  Cognition: Intact and Oriented X4    Progress Towards Goal(s):  None    Additional Comments:  n/a    Next Step:   Continue with Current Services     _________________________________________________________________________________________________________________________________      Group 2:    Date of Group: 10/23/2024    Start Time: 9:45am End Time: 10:36  am    Number of Participants: 5    Focus/Topic of Group Activity: Learning about our feelings and behaviors         Summary of Group : Group members discussed  when was the last time they felt shame . Group members discussed what makes them feel shameful now .  Therapeutic Intervention: Empathic listening, Cognitive Behavioral Therapy (CBT)    Patient's Response to Intervention: Patient did not return to group after break.    Mental Status Exam:  Appearance: Appropriately dressed and groomed  Mood: euthymic  Affect: Full  Behavior: Pleasant, Cooperative, and Interactive;

## 2024-10-29 ENCOUNTER — OFFICE VISIT (OUTPATIENT)
Dept: UROLOGY | Age: 39
End: 2024-10-29
Payer: MEDICAID

## 2024-10-29 VITALS — HEIGHT: 72 IN | RESPIRATION RATE: 16 BRPM | WEIGHT: 184 LBS | BODY MASS INDEX: 24.92 KG/M2

## 2024-10-29 DIAGNOSIS — R79.89 LOW TESTOSTERONE IN MALE: Primary | ICD-10-CM

## 2024-10-29 DIAGNOSIS — R68.82 DECREASED LIBIDO: ICD-10-CM

## 2024-10-29 DIAGNOSIS — Z80.42 FAMILY HISTORY OF PROSTATE CANCER: ICD-10-CM

## 2024-10-29 DIAGNOSIS — Z30.09 FAMILY PLANNING: ICD-10-CM

## 2024-10-29 PROCEDURE — 99204 OFFICE O/P NEW MOD 45 MIN: CPT

## 2024-10-29 RX ORDER — NEEDLES, DISPOSABLE 25GX5/8"
1 NEEDLE, DISPOSABLE MISCELLANEOUS
Qty: 5 EACH | Refills: 5 | Status: SHIPPED | OUTPATIENT
Start: 2024-10-29 | End: 2024-10-29

## 2024-10-29 RX ORDER — TESTOSTERONE CYPIONATE 200 MG/ML
200 INJECTION, SOLUTION INTRAMUSCULAR
Qty: 4 ML | Refills: 3 | Status: SHIPPED | OUTPATIENT
Start: 2024-10-29 | End: 2025-05-28

## 2024-10-29 RX ORDER — TESTOSTERONE CYPIONATE 200 MG/ML
200 INJECTION, SOLUTION INTRAMUSCULAR
Qty: 4 ML | Refills: 3 | Status: SHIPPED | OUTPATIENT
Start: 2024-10-29 | End: 2024-10-29

## 2024-10-29 RX ORDER — NEEDLES, DISPOSABLE 25GX5/8"
1 NEEDLE, DISPOSABLE MISCELLANEOUS
Qty: 5 EACH | Refills: 5 | Status: SHIPPED | OUTPATIENT
Start: 2024-10-29

## 2024-10-29 NOTE — PROGRESS NOTES
Per patient medication list is the same as previous reviewed. Medication list not available to review.   
and avoid toxicity.    4.   Family History of Prostate Cancer  - +History in patient's father, diagnosed ~ age 65.   - Will monitor PSA closely while on TRT.     *Follow-up for testosterone injection teaching in the office. Return in 3 months to review TRT labs and semen analysis results.    Derrick Calderon PA-C  Urology

## 2025-01-23 ENCOUNTER — APPOINTMENT (OUTPATIENT)
Dept: GENERAL RADIOLOGY | Age: 40
End: 2025-01-23
Payer: MEDICAID

## 2025-01-23 ENCOUNTER — HOSPITAL ENCOUNTER (EMERGENCY)
Age: 40
Discharge: HOME | End: 2025-01-23
Attending: EMERGENCY MEDICINE
Payer: MEDICAID

## 2025-01-23 ENCOUNTER — APPOINTMENT (OUTPATIENT)
Dept: CT IMAGING | Age: 40
End: 2025-01-23
Payer: MEDICAID

## 2025-01-23 VITALS
HEIGHT: 72 IN | RESPIRATION RATE: 15 BRPM | HEART RATE: 89 BPM | SYSTOLIC BLOOD PRESSURE: 125 MMHG | TEMPERATURE: 98.4 F | DIASTOLIC BLOOD PRESSURE: 72 MMHG | OXYGEN SATURATION: 96 % | WEIGHT: 190 LBS | BODY MASS INDEX: 25.73 KG/M2

## 2025-01-23 DIAGNOSIS — R56.9 SEIZURE (HCC): Primary | ICD-10-CM

## 2025-01-23 LAB
AMMONIA PLAS-MCNC: 16 UMOL/L (ref 11–60)
ANION GAP SERPL CALC-SCNC: 11 MEQ/L (ref 8–16)
BASOPHILS ABSOLUTE: 0 THOU/MM3 (ref 0–0.1)
BASOPHILS NFR BLD AUTO: 0.7 %
BUN SERPL-MCNC: 18 MG/DL (ref 7–22)
CALCIUM SERPL-MCNC: 9.6 MG/DL (ref 8.5–10.5)
CHLORIDE SERPL-SCNC: 100 MEQ/L (ref 98–111)
CK SERPL-CCNC: 198 U/L (ref 55–170)
CO2 SERPL-SCNC: 24 MEQ/L (ref 23–33)
CREAT SERPL-MCNC: 1 MG/DL (ref 0.4–1.2)
DEPRECATED RDW RBC AUTO: 47 FL (ref 35–45)
EOSINOPHIL NFR BLD AUTO: 3.1 %
EOSINOPHILS ABSOLUTE: 0.2 THOU/MM3 (ref 0–0.4)
ERYTHROCYTE [DISTWIDTH] IN BLOOD BY AUTOMATED COUNT: 13.3 % (ref 11.5–14.5)
GFR SERPL CREATININE-BSD FRML MDRD: > 90 ML/MIN/1.73M2
GLUCOSE SERPL-MCNC: 86 MG/DL (ref 70–108)
HCT VFR BLD AUTO: 44.2 % (ref 42–52)
HGB BLD-MCNC: 14.8 GM/DL (ref 14–18)
IMM GRANULOCYTES # BLD AUTO: 0.02 THOU/MM3 (ref 0–0.07)
IMM GRANULOCYTES NFR BLD AUTO: 0.3 %
LACTATE SERPL-SCNC: 1.1 MMOL/L (ref 0.5–2)
LIPASE SERPL-CCNC: 22.3 U/L (ref 5.6–51.3)
LYMPHOCYTES ABSOLUTE: 0.8 THOU/MM3 (ref 1–4.8)
LYMPHOCYTES NFR BLD AUTO: 13.3 %
MAGNESIUM SERPL-MCNC: 2.5 MG/DL (ref 1.6–2.4)
MCH RBC QN AUTO: 31.8 PG (ref 26–33)
MCHC RBC AUTO-ENTMCNC: 33.5 GM/DL (ref 32.2–35.5)
MCV RBC AUTO: 94.8 FL (ref 80–94)
MONOCYTES ABSOLUTE: 0.6 THOU/MM3 (ref 0.4–1.3)
MONOCYTES NFR BLD AUTO: 10.2 %
NEUTROPHILS ABSOLUTE: 4.3 THOU/MM3 (ref 1.8–7.7)
NEUTROPHILS NFR BLD AUTO: 72.4 %
NRBC BLD AUTO-RTO: 0 /100 WBC
OSMOLALITY SERPL CALC.SUM OF ELEC: 271.3 MOSMOL/KG (ref 275–300)
PHENYTOIN SERPL-MCNC: < 0.8 UG/ML (ref 6–18)
PHOSPHATE SERPL-MCNC: 2.4 MG/DL (ref 2.4–4.7)
PLATELET # BLD AUTO: 222 THOU/MM3 (ref 130–400)
PMV BLD AUTO: 10.2 FL (ref 9.4–12.4)
POTASSIUM SERPL-SCNC: 4.3 MEQ/L (ref 3.5–5.2)
RBC # BLD AUTO: 4.66 MILL/MM3 (ref 4.7–6.1)
SODIUM SERPL-SCNC: 135 MEQ/L (ref 135–145)
TROPONIN, HIGH SENSITIVITY: < 6 NG/L (ref 0–12)
WBC # BLD AUTO: 5.9 THOU/MM3 (ref 4.8–10.8)

## 2025-01-23 PROCEDURE — 80048 BASIC METABOLIC PNL TOTAL CA: CPT

## 2025-01-23 PROCEDURE — 82550 ASSAY OF CK (CPK): CPT

## 2025-01-23 PROCEDURE — 71045 X-RAY EXAM CHEST 1 VIEW: CPT

## 2025-01-23 PROCEDURE — 83605 ASSAY OF LACTIC ACID: CPT

## 2025-01-23 PROCEDURE — 6360000002 HC RX W HCPCS: Performed by: EMERGENCY MEDICINE

## 2025-01-23 PROCEDURE — 36415 COLL VENOUS BLD VENIPUNCTURE: CPT

## 2025-01-23 PROCEDURE — 96374 THER/PROPH/DIAG INJ IV PUSH: CPT

## 2025-01-23 PROCEDURE — 85025 COMPLETE CBC W/AUTO DIFF WBC: CPT

## 2025-01-23 PROCEDURE — 83735 ASSAY OF MAGNESIUM: CPT

## 2025-01-23 PROCEDURE — 73080 X-RAY EXAM OF ELBOW: CPT

## 2025-01-23 PROCEDURE — 82140 ASSAY OF AMMONIA: CPT

## 2025-01-23 PROCEDURE — 84100 ASSAY OF PHOSPHORUS: CPT

## 2025-01-23 PROCEDURE — 80185 ASSAY OF PHENYTOIN TOTAL: CPT

## 2025-01-23 PROCEDURE — 99285 EMERGENCY DEPT VISIT HI MDM: CPT

## 2025-01-23 PROCEDURE — 84484 ASSAY OF TROPONIN QUANT: CPT

## 2025-01-23 PROCEDURE — 93005 ELECTROCARDIOGRAM TRACING: CPT | Performed by: EMERGENCY MEDICINE

## 2025-01-23 PROCEDURE — 83690 ASSAY OF LIPASE: CPT

## 2025-01-23 PROCEDURE — 72125 CT NECK SPINE W/O DYE: CPT

## 2025-01-23 PROCEDURE — 70450 CT HEAD/BRAIN W/O DYE: CPT

## 2025-01-23 RX ORDER — LEVETIRACETAM 500 MG/5ML
1000 INJECTION, SOLUTION, CONCENTRATE INTRAVENOUS EVERY 12 HOURS
Status: DISCONTINUED | OUTPATIENT
Start: 2025-01-24 | End: 2025-01-23

## 2025-01-23 RX ORDER — LEVETIRACETAM 500 MG/5ML
1000 INJECTION, SOLUTION, CONCENTRATE INTRAVENOUS ONCE
Status: DISCONTINUED | OUTPATIENT
Start: 2025-01-23 | End: 2025-01-23 | Stop reason: HOSPADM

## 2025-01-23 RX ORDER — LEVETIRACETAM 500 MG/1
500 TABLET ORAL 2 TIMES DAILY
Qty: 60 TABLET | Refills: 0 | Status: SHIPPED | OUTPATIENT
Start: 2025-01-23 | End: 2025-02-22

## 2025-01-23 RX ORDER — LEVETIRACETAM 500 MG/5ML
1000 INJECTION, SOLUTION, CONCENTRATE INTRAVENOUS EVERY 12 HOURS
Status: DISCONTINUED | OUTPATIENT
Start: 2025-01-23 | End: 2025-01-23

## 2025-01-23 RX ADMIN — LEVETIRACETAM 1000 MG: 100 INJECTION INTRAVENOUS at 19:35

## 2025-01-23 ASSESSMENT — PAIN - FUNCTIONAL ASSESSMENT: PAIN_FUNCTIONAL_ASSESSMENT: 0-10

## 2025-01-23 ASSESSMENT — PAIN DESCRIPTION - LOCATION: LOCATION: ELBOW;HEAD

## 2025-01-23 ASSESSMENT — PAIN SCALES - GENERAL: PAINLEVEL_OUTOF10: 9

## 2025-01-23 ASSESSMENT — PAIN DESCRIPTION - ORIENTATION: ORIENTATION: RIGHT

## 2025-01-23 ASSESSMENT — PAIN DESCRIPTION - PAIN TYPE: TYPE: ACUTE PAIN

## 2025-01-23 NOTE — ED NOTES
Patient presents to the ED with significant other for concerns of seizure like activity. Girlfriend reports she heard a bunch of thumping sounds like someone was running through the house; she found patient the be shaking and then go unresponsive and blue. Girlfriend states she attempted to call 911, but patient came around and did not want a squad. Patient denies a history of seizures. Patient is alert and oriented x4. He reports right elbow pain and a dull headache. Patient denies any drug or alcohol use. EKG completed in intake during triage. VSS at this time.

## 2025-01-23 NOTE — ED NOTES
Patient girlfriend pulled this nurse aside to inform that last night they did do some cocaine and wanted to be honest so patient could be appropriately treated.

## 2025-01-24 LAB
EKG ATRIAL RATE: 77 BPM
EKG P AXIS: 54 DEGREES
EKG P-R INTERVAL: 142 MS
EKG Q-T INTERVAL: 390 MS
EKG QRS DURATION: 88 MS
EKG QTC CALCULATION (BAZETT): 441 MS
EKG R AXIS: 71 DEGREES
EKG T AXIS: 57 DEGREES
EKG VENTRICULAR RATE: 77 BPM

## 2025-01-24 PROCEDURE — 93010 ELECTROCARDIOGRAM REPORT: CPT | Performed by: INTERNAL MEDICINE

## 2025-01-24 NOTE — ED NOTES
Pt medicated per MAR and Dr Mcintosh at bedside for evaluation. No acute distress noted. Telemetry in place and call light in reach

## 2025-01-24 NOTE — DISCHARGE INSTRUCTIONS
We did not identify any life-threatening causes of your seizure.  It is very important that you do not do any recreational drugs as they may be the cause of your seizure.  You should also follow-up with your primary doctor, as well as neurology.  A referral has been provided for Dr. Corona. Please call his office in the morning to schedule an appointment.  You should not return to work until you are cleared by a physician.

## 2025-01-24 NOTE — ED PROVIDER NOTES
ATTENDING NOTE:    I supervised and discussed the history, physical exam and the management of this patient with the resident. I reviewed the resident's note and agree with the documented findings and plan of care.  Please see my additional note.    Patient states he was playing a game on his phone tonight, leaning on a counter in his house. Reports the next thing he knew he woke up on the floor and his girl friend had called 911, his tongue was sore. Denies urinary incontinence. Denies any preceding symptoms. He reports his girlfriend heard a noise and came out, he was on the floor flopping around, his dog was upset and wouldn't let his girlfriend near him. He believes this lasted a few minutes. He was sleepy and confused initially but is back to normal now. He does report some neck pain, right elbow pain, and headache. He reports cocaine use 2-3 hours prior to this event. He has done cocaine in the past intermittently, has not had this happen in relation to use before. He states a few months ago he was in his barn playing a game on his phone, that his girlfriend told him later that his eyes and body were moving in a strange way and she found him on the ground. He denies a history of seizure personally and denies family history. Will check ct, labs, EKG. Believe today was likely patient's second episode of seizure, will start keppra. If labs and imaging are reassuring will plan to send neuro referral to ensure close follow up, discuss seizure precautions.    I personally saw and examined the patient.  I have reviewed and agree with the resident's findings, including all diagnostic interpretations and treatment plans as written.  I was present for the key portion of any procedures performed and the inclusive time noted in any critical care statement.    Electronically verified by Nighat Smith MD  01/24/25 1933

## 2025-01-24 NOTE — ED PROVIDER NOTES
Pt Name: Joseph M Litten  MRN: [id number]  Birthdate 1985  Date of evaluation: 1/23/2025  ED Resident: Mita Mancilla MD  ED Attending: Dr. Mcintosh    CHIEF COMPLAINT       Chief Complaint   Patient presents with    Seizures     History obtained from the patient.    HISTORY OF PRESENT ILLNESS    HPI  Joseph M Litten is a 39 y.o. male who presents to the emergency department for evaluation of seizure      The patient states that he was standing at a counter playing a game on his phone and the next thing he remembers is his girlfriend telling him he had a seizure. His girlfriend states that she was in another room and heard some noises that sounded similar to someone running across the floor. She found the patient on the kitchen floor shaking. He eventually woke up but remained \"out of it\" for \"a while.\" He did not know who his girlfriend was and was confused. Currently back to baseline. He endorses biting his tongue, having some right elbow pain and neck pain.     No known seizure disorder, no recent illness, no aura. The patient endorses using cocaine prior to the episode but has been using it for a long time and has not bought it from a new source. In the past he had a similar episode once in a barn. Woke up similarly confused with a head injury. He was never evaluated for seizures.     The patient has no other acute complaints at this time.    PAST MEDICAL AND SURGICAL HISTORY     Past Medical History:   Diagnosis Date    Alcohol abuse     Chronic allergic rhinitis     Drug abuse (HCC)     Major depressive disorder, single episode, severe with anxious distress (HCC) 12/7/2017     Past Surgical History:   Procedure Laterality Date    ANTERIOR CRUCIATE LIGAMENT REPAIR      FINGER SURGERY  01/10/2015    I and D left index finger    KNEE SURGERY      SKIN GRAFT      left ankle    TIBIA FRACTURE SURGERY      WISDOM TOOTH EXTRACTION         MEDICATIONS   No current facility-administered medications for this  Not Applicable.    Final Assessment and Plan:   Workup did not identify any traumatic injuries, CTH and C-spine normal.   Mag and CK mildly elevated but not requiring intervention or repeat.   EKG was NSR without ST elevation or depression, no T wave inversions. Trop not elevated   The patient was given a loading dose of Keppra and referral to Dr. Corona. Started on Keppra 500 BID     MEDICATION CHANGES   DISCHARGE MEDICATIONS:  Discharge Medication List as of 1/23/2025  8:09 PM        START taking these medications    Details   levETIRAcetam (KEPPRA) 500 MG tablet Take 1 tablet by mouth 2 times daily, Disp-60 tablet, R-0Normal                FINAL DISPOSITION     Final diagnoses:   Seizure (HCC)     Condition: stable  Dispo: Discharge to home    PATIENT REFERRED TO:  Jh Fried, APRN - CNP  [address] [facility] [id number] [phone]          Celso Corona MD  [address]  [address]  [facility] [address]  [phone]            The results of pertinent diagnostic studies and exam findings were discussed. The patient’s provisional diagnosis and plan of care were discussed with the patient who expressed understanding.     PROCEDURES: (None if blank)  Procedures:     This transcription was electronically signed. Parts of this transcriptions may have been dictated by use of voice recognition software and electronically transcribed, and parts may have been transcribed with the assistance of an ED scribe. The transcription may contain errors not detected in proofreading.    Electronically Signed: Mita Mancilla MD, 01/24/25, 6:13 PM

## 2025-01-24 NOTE — DISCHARGE INSTR - COC
Continuity of Care Form    Patient Name: Joseph M Litten   :  1985  MRN:  [id number]    Admit date:  2025  Discharge date:  ***    Code Status Order: Prior   Advance Directives:   Advance Care Flowsheet Documentation             Admitting Physician:  No admitting provider for patient encounter.  PCP: Jh Fried APRN - CNP    Discharging Nurse: ***  Discharging Hospital Unit/Room#:   Discharging Unit Phone Number: ***    Emergency Contact:   Extended Emergency Contact Information  Primary Emergency Contact: Jenni Litten  Address: [id number] [place] [address]  Home Phone: [phone]  Relation: Parent    Past Surgical History:  Past Surgical History:   Procedure Laterality Date    ANTERIOR CRUCIATE LIGAMENT REPAIR      FINGER SURGERY  01/10/2015    I and D left index finger    KNEE SURGERY      SKIN GRAFT      left ankle    TIBIA FRACTURE SURGERY      WISDOM TOOTH EXTRACTION         Immunization History:   Immunization History   Administered Date(s) Administered    TDaP, ADACEL (age 10y-64y), BOOSTRIX (age 10y+), IM, 0.5mL 2015, 06/15/2024       Active Problems:  Patient Active Problem List   Diagnosis Code    Alcohol abuse F10.10    Drug abuse ([facility]) F19.10    Major depressive disorder, single episode, severe with anxious distress ([facility]) F32.2    Cocaine abuse ([facility]) F14.10    Alcohol withdrawal syndrome without complication ([facility]) F10.930    ARISTEO (obstructive sleep apnea) G47.33    Impaired glucose tolerance R73.02    Anemia, macrocytic D53.9    Alcohol dependence with uncomplicated withdrawal ([facility]) F10.230    Alcohol abuse with withdrawal ([facility]) F10.139    Ganglion cyst of finger of right hand M67.441    Insomnia G47.00    Anxiety and depression F41.9, F32.A    Cocaine use disorder ([facility]) F14.10       Isolation/Infection:   Isolation            No Isolation          Patient Infection Status       None to display                     Nurse Assessment:  Last

## 2025-02-11 ENCOUNTER — APPOINTMENT (OUTPATIENT)
Dept: CT IMAGING | Age: 40
End: 2025-02-11
Payer: MEDICAID

## 2025-02-11 ENCOUNTER — HOSPITAL ENCOUNTER (EMERGENCY)
Age: 40
Discharge: HOME | End: 2025-02-11
Attending: EMERGENCY MEDICINE
Payer: MEDICAID

## 2025-02-11 ENCOUNTER — APPOINTMENT (OUTPATIENT)
Dept: GENERAL RADIOLOGY | Age: 40
End: 2025-02-11
Payer: MEDICAID

## 2025-02-11 VITALS
BODY MASS INDEX: 25.77 KG/M2 | HEART RATE: 81 BPM | RESPIRATION RATE: 17 BRPM | SYSTOLIC BLOOD PRESSURE: 131 MMHG | TEMPERATURE: 98.1 F | DIASTOLIC BLOOD PRESSURE: 92 MMHG | WEIGHT: 190 LBS | OXYGEN SATURATION: 97 %

## 2025-02-11 DIAGNOSIS — G40.919 BREAKTHROUGH SEIZURE (HCC): Primary | ICD-10-CM

## 2025-02-11 LAB
ALBUMIN SERPL BCG-MCNC: 4.5 G/DL (ref 3.5–5.1)
ALP SERPL-CCNC: 51 U/L (ref 38–126)
ALT SERPL W/O P-5'-P-CCNC: 13 U/L (ref 11–66)
AMMONIA PLAS-MCNC: 39 UMOL/L (ref 11–60)
ANION GAP SERPL CALC-SCNC: 22 MEQ/L (ref 8–16)
AST SERPL-CCNC: 12 U/L (ref 5–40)
BACTERIA URNS QL MICRO: ABNORMAL /HPF
BASOPHILS ABSOLUTE: 0 THOU/MM3 (ref 0–0.1)
BASOPHILS NFR BLD AUTO: 0.5 %
BILIRUB CONJ SERPL-MCNC: 0.2 MG/DL (ref 0–0.3)
BILIRUB SERPL-MCNC: 0.6 MG/DL (ref 0.3–1.2)
BILIRUB UR QL STRIP.AUTO: ABNORMAL
BUN SERPL-MCNC: 13 MG/DL (ref 7–22)
CALCIUM SERPL-MCNC: 9.4 MG/DL (ref 8.5–10.5)
CASTS #/AREA URNS LPF: ABNORMAL /LPF
CASTS 2: ABNORMAL /LPF
CHARACTER UR: CLEAR
CHLORIDE SERPL-SCNC: 103 MEQ/L (ref 98–111)
CK SERPL-CCNC: 93 U/L (ref 55–170)
CO2 SERPL-SCNC: 18 MEQ/L (ref 23–33)
COLOR, UA: ABNORMAL
CREAT SERPL-MCNC: 0.9 MG/DL (ref 0.4–1.2)
CRYSTALS URNS MICRO: ABNORMAL
DEPRECATED RDW RBC AUTO: 43.6 FL (ref 35–45)
EOSINOPHIL NFR BLD AUTO: 2.2 %
EOSINOPHILS ABSOLUTE: 0.1 THOU/MM3 (ref 0–0.4)
EPITHELIAL CELLS, UA: ABNORMAL /HPF
ERYTHROCYTE [DISTWIDTH] IN BLOOD BY AUTOMATED COUNT: 12.7 % (ref 11.5–14.5)
ETHANOL SERPL-MCNC: < 0.01 % (ref 0–0.08)
GFR SERPL CREATININE-BSD FRML MDRD: > 90 ML/MIN/1.73M2
GLUCOSE SERPL-MCNC: 87 MG/DL (ref 70–108)
GLUCOSE UR QL STRIP.AUTO: NEGATIVE MG/DL
HCT VFR BLD AUTO: 40.3 % (ref 42–52)
HGB BLD-MCNC: 14 GM/DL (ref 14–18)
HGB UR QL STRIP.AUTO: NEGATIVE
IMM GRANULOCYTES # BLD AUTO: 0.01 THOU/MM3 (ref 0–0.07)
IMM GRANULOCYTES NFR BLD AUTO: 0.2 %
KETONES UR QL STRIP.AUTO: 15
LACTATE SERPL-SCNC: 0.7 MMOL/L (ref 0.5–2)
LYMPHOCYTES ABSOLUTE: 1.7 THOU/MM3 (ref 1–4.8)
LYMPHOCYTES NFR BLD AUTO: 26.9 %
MAGNESIUM SERPL-MCNC: 2.1 MG/DL (ref 1.6–2.4)
MCH RBC QN AUTO: 32.5 PG (ref 26–33)
MCHC RBC AUTO-ENTMCNC: 34.7 GM/DL (ref 32.2–35.5)
MCV RBC AUTO: 93.5 FL (ref 80–94)
MISCELLANEOUS 2: ABNORMAL
MONOCYTES ABSOLUTE: 0.5 THOU/MM3 (ref 0.4–1.3)
MONOCYTES NFR BLD AUTO: 7.1 %
NEUTROPHILS ABSOLUTE: 4 THOU/MM3 (ref 1.8–7.7)
NEUTROPHILS NFR BLD AUTO: 63.1 %
NITRITE UR QL STRIP: NEGATIVE
NRBC BLD AUTO-RTO: 0 /100 WBC
OSMOLALITY SERPL CALC.SUM OF ELEC: 284.5 MOSMOL/KG (ref 275–300)
PH UR STRIP.AUTO: 5.5 [PH] (ref 5–9)
PLATELET # BLD AUTO: 219 THOU/MM3 (ref 130–400)
PMV BLD AUTO: 10.4 FL (ref 9.4–12.4)
POTASSIUM SERPL-SCNC: 4.1 MEQ/L (ref 3.5–5.2)
PROT SERPL-MCNC: 7 G/DL (ref 6.1–8)
PROT UR STRIP.AUTO-MCNC: ABNORMAL MG/DL
RBC # BLD AUTO: 4.31 MILL/MM3 (ref 4.7–6.1)
RBC URINE: ABNORMAL /HPF
RENAL EPI CELLS #/AREA URNS HPF: ABNORMAL /[HPF]
SODIUM SERPL-SCNC: 143 MEQ/L (ref 135–145)
SP GR UR REFRACT.AUTO: > 1.03 (ref 1–1.03)
UROBILINOGEN, URINE: 1 EU/DL (ref 0–1)
WBC # BLD AUTO: 6.4 THOU/MM3 (ref 4.8–10.8)
WBC #/AREA URNS HPF: ABNORMAL /HPF
WBC #/AREA URNS HPF: NEGATIVE /[HPF]
YEAST LIKE FUNGI URNS QL MICRO: ABNORMAL

## 2025-02-11 PROCEDURE — 2580000003 HC RX 258: Performed by: EMERGENCY MEDICINE

## 2025-02-11 PROCEDURE — 93005 ELECTROCARDIOGRAM TRACING: CPT | Performed by: EMERGENCY MEDICINE

## 2025-02-11 PROCEDURE — 36415 COLL VENOUS BLD VENIPUNCTURE: CPT

## 2025-02-11 PROCEDURE — 82140 ASSAY OF AMMONIA: CPT

## 2025-02-11 PROCEDURE — 82248 BILIRUBIN DIRECT: CPT

## 2025-02-11 PROCEDURE — 96374 THER/PROPH/DIAG INJ IV PUSH: CPT

## 2025-02-11 PROCEDURE — 70450 CT HEAD/BRAIN W/O DYE: CPT

## 2025-02-11 PROCEDURE — 82077 ASSAY SPEC XCP UR&BREATH IA: CPT

## 2025-02-11 PROCEDURE — 83735 ASSAY OF MAGNESIUM: CPT

## 2025-02-11 PROCEDURE — 71045 X-RAY EXAM CHEST 1 VIEW: CPT

## 2025-02-11 PROCEDURE — 85025 COMPLETE CBC W/AUTO DIFF WBC: CPT

## 2025-02-11 PROCEDURE — 80053 COMPREHEN METABOLIC PANEL: CPT

## 2025-02-11 PROCEDURE — 6360000002 HC RX W HCPCS: Performed by: EMERGENCY MEDICINE

## 2025-02-11 PROCEDURE — 96372 THER/PROPH/DIAG INJ SC/IM: CPT

## 2025-02-11 PROCEDURE — 82550 ASSAY OF CK (CPK): CPT

## 2025-02-11 PROCEDURE — 72125 CT NECK SPINE W/O DYE: CPT

## 2025-02-11 PROCEDURE — 81001 URINALYSIS AUTO W/SCOPE: CPT

## 2025-02-11 PROCEDURE — 6370000000 HC RX 637 (ALT 250 FOR IP): Performed by: EMERGENCY MEDICINE

## 2025-02-11 PROCEDURE — 99285 EMERGENCY DEPT VISIT HI MDM: CPT

## 2025-02-11 PROCEDURE — 73030 X-RAY EXAM OF SHOULDER: CPT

## 2025-02-11 PROCEDURE — 83605 ASSAY OF LACTIC ACID: CPT

## 2025-02-11 PROCEDURE — 96361 HYDRATE IV INFUSION ADD-ON: CPT

## 2025-02-11 RX ORDER — IBUPROFEN 200 MG
400 TABLET ORAL ONCE
Status: COMPLETED | OUTPATIENT
Start: 2025-02-11 | End: 2025-02-11

## 2025-02-11 RX ORDER — ORPHENADRINE CITRATE 100 MG/1
100 TABLET ORAL 2 TIMES DAILY
Qty: 20 TABLET | Refills: 0 | Status: SHIPPED | OUTPATIENT
Start: 2025-02-11 | End: 2025-02-21

## 2025-02-11 RX ORDER — LEVETIRACETAM 500 MG/1
1000 TABLET ORAL ONCE
Status: COMPLETED | OUTPATIENT
Start: 2025-02-11 | End: 2025-02-11

## 2025-02-11 RX ORDER — 0.9 % SODIUM CHLORIDE 0.9 %
1000 INTRAVENOUS SOLUTION INTRAVENOUS ONCE
Status: COMPLETED | OUTPATIENT
Start: 2025-02-11 | End: 2025-02-11

## 2025-02-11 RX ORDER — KETOROLAC TROMETHAMINE 30 MG/ML
30 INJECTION, SOLUTION INTRAMUSCULAR; INTRAVENOUS ONCE
Status: COMPLETED | OUTPATIENT
Start: 2025-02-11 | End: 2025-02-11

## 2025-02-11 RX ORDER — ORPHENADRINE CITRATE 30 MG/ML
60 INJECTION INTRAMUSCULAR; INTRAVENOUS ONCE
Status: COMPLETED | OUTPATIENT
Start: 2025-02-11 | End: 2025-02-11

## 2025-02-11 RX ORDER — IBUPROFEN 600 MG/1
600 TABLET, FILM COATED ORAL 3 TIMES DAILY PRN
Qty: 30 TABLET | Refills: 0 | Status: SHIPPED | OUTPATIENT
Start: 2025-02-11

## 2025-02-11 RX ADMIN — IBUPROFEN 400 MG: 200 TABLET, FILM COATED ORAL at 19:26

## 2025-02-11 RX ADMIN — ORPHENADRINE CITRATE 60 MG: 60 INJECTION INTRAMUSCULAR; INTRAVENOUS at 21:03

## 2025-02-11 RX ADMIN — LEVETIRACETAM 1000 MG: 500 TABLET, FILM COATED ORAL at 20:15

## 2025-02-11 RX ADMIN — SODIUM CHLORIDE 1000 ML: 9 INJECTION, SOLUTION INTRAVENOUS at 19:19

## 2025-02-11 RX ADMIN — KETOROLAC TROMETHAMINE 30 MG: 30 INJECTION, SOLUTION INTRAMUSCULAR at 21:03

## 2025-02-11 ASSESSMENT — PAIN - FUNCTIONAL ASSESSMENT
PAIN_FUNCTIONAL_ASSESSMENT: 0-10
PAIN_FUNCTIONAL_ASSESSMENT: 0-10
PAIN_FUNCTIONAL_ASSESSMENT: NONE - DENIES PAIN

## 2025-02-11 ASSESSMENT — PAIN SCALES - GENERAL
PAINLEVEL_OUTOF10: 4
PAINLEVEL_OUTOF10: 4
PAINLEVEL_OUTOF10: 6

## 2025-02-11 ASSESSMENT — PAIN DESCRIPTION - ORIENTATION: ORIENTATION: RIGHT

## 2025-02-11 ASSESSMENT — PAIN DESCRIPTION - LOCATION: LOCATION: SHOULDER;OTHER (COMMENT)

## 2025-02-11 NOTE — ED PROVIDER NOTES
PATIENT: Shaan Snow : 1936 DOS: 2020     [facility]    Seen for followup visit for his dementia, agitation, encephalopathy. SUBJECTIVE:  This 68-year-old gentleman was evaluated in his room. The patient has been doing well since re-admitted to this facility. The patient has not had any nausea, vomiting, or recent emesis. The patient's oral intake has been good at times. The patient has not had any new change in his bowel or bladder habits. OBJECTIVE:  He appeared chronically ill. Pupils are small, they are reactive. Oral mucosa is moist.  Chest showed diminished breath sounds. No crackles, no wheezing. Cardiovascular exam showed a regular rate. Abdomen is soft, nontender. Extremities showed trace dorsal pedal pulse. ASSESSMENT AND PLAN:  1. Degenerative joint disease:  No pain crisis. 2.   Dementia with psychosis:  The patient has been redirectable, clinically stable. 3.   Constipation: We will monitor.         Electronically Signed By: Dori Morales M.D. on 2020 10:14:52  ______________________________  Dori Morales M.D.  [id number]  D: 2020 20:01:23  T: 2020 07:37:51    cc: Terrance Recinos x-ray does not have acute osseous injury.    Vital signs are stable upon reassessment.  Patient receives Keppra and ibuprofen in ED in addition to normal saline bolus.  Discharged with PCP and neurology follow-up in 1-2 weeks.  He has enough Keppra prescription at home.  He is discussed importance of medical compliance of anticonvulsant.  He is also suggested no driving, swimming alone, climbing height, or operating heavy machine until he is cleared by his neurologist.    ED MEDICATIONS:  (None if blank)  Medications   levETIRAcetam (KEPPRA) tablet 1,000 mg (1,000 mg Oral Given 2/11/25 2015)   ibuprofen (ADVIL;MOTRIN) tablet 400 mg (400 mg Oral Given 2/11/25 1926)   sodium chloride 0.9 % bolus 1,000 mL (1,000 mLs IntraVENous New Bag 2/11/25 1919)       Vitals:    02/11/25 1731 02/11/25 1924 02/11/25 2017   BP: (!) 145/98  (!) 131/92   Pulse: 85 90 81   Resp: 17 15 17   Temp: 98.1 °F (36.7 °C)     TempSrc: Oral     SpO2: 99% 94% 97%   Weight: 86.2 kg (190 lb)           NUMBER AND COMPLEXITY OF PROBLEMS     Problem List This Visit: seizure    Pertinent Comorbid Conditions:  See HPI, PMH and PSH    DATA REVIEWED(none if left blank)    Code Status:  Reviewed with patient and/or family as full code    External Documentation Reviewed: Relevant record in care everywhere is reviewed (If there is any).    Previous relevant patient encounter documents & history available on EMR was reviewed: Yes (If there is any)    See Formal Diagnostic Results above for the lab and radiology tests and orders.    Shared Decision-Making: ED workups, treatment plan and dispositions are discussed with the patient/family, questions answered     FINAL IMPRESSION AND DISPOSITION     1. Breakthrough seizure (HCC)        DISPOSITION Decision To Discharge 02/11/2025 08:43:21 PM   DISPOSITION CONDITION Stable     OUTPATIENT FOLLOW UP THE PATIENT:  Jh Fried, APRN - CNP  [address]  [place] [address]  [phone]    In 1 week  ED discharge

## 2025-02-11 NOTE — ED NOTES
Patient to the ED with complaint of seizure earlier today. Patient states that he was sitting on a bar stool when the next moment he woke to find himself lying on the floor. Patient states that his girlfriend found him and noted that he was \"confused for a moment.\" Patient states that he has had two seizure in the past with most recent one being two weeks ago. Patient states that he was prescribed keppra but has not been taking this. Patient admits to using cocaine last night. He reports that he is having head pain as well as right shoulder pain from falling to the floor. A raised area is noted on the superior frontal aspect of the forehead. Patient is resting in bed with easy and unlabored respirations. Call light in reach. Side rails up x2. Patient denies further complaints or concerns. Will monitor.

## 2025-02-12 LAB
EKG ATRIAL RATE: 96 BPM
EKG P AXIS: 72 DEGREES
EKG P-R INTERVAL: 142 MS
EKG Q-T INTERVAL: 380 MS
EKG QRS DURATION: 92 MS
EKG QTC CALCULATION (BAZETT): 480 MS
EKG R AXIS: 78 DEGREES
EKG T AXIS: 55 DEGREES
EKG VENTRICULAR RATE: 96 BPM

## 2025-02-12 PROCEDURE — 93010 ELECTROCARDIOGRAM REPORT: CPT | Performed by: INTERNAL MEDICINE

## 2025-02-12 NOTE — ED NOTES
Pt ambulated to restroom w/o difficulty. Pt resting on cot w/ eyes open. Respirations even and unlabored.

## 2025-07-31 ENCOUNTER — HOSPITAL ENCOUNTER (EMERGENCY)
Age: 40
Discharge: HOME | End: 2025-07-31
Payer: MEDICAID

## 2025-07-31 ENCOUNTER — HOSPITAL ENCOUNTER (OUTPATIENT)
Dept: GENERAL RADIOLOGY | Age: 40
Discharge: HOME | End: 2025-07-31
Payer: MEDICAID

## 2025-07-31 ENCOUNTER — OFFICE VISIT (OUTPATIENT)
Dept: FAMILY MEDICINE CLINIC | Age: 40
End: 2025-07-31
Payer: MEDICAID

## 2025-07-31 VITALS
OXYGEN SATURATION: 100 % | TEMPERATURE: 98.2 F | BODY MASS INDEX: 25.9 KG/M2 | DIASTOLIC BLOOD PRESSURE: 97 MMHG | HEART RATE: 66 BPM | SYSTOLIC BLOOD PRESSURE: 149 MMHG | RESPIRATION RATE: 16 BRPM | WEIGHT: 191 LBS

## 2025-07-31 VITALS
DIASTOLIC BLOOD PRESSURE: 88 MMHG | OXYGEN SATURATION: 99 % | RESPIRATION RATE: 16 BRPM | BODY MASS INDEX: 25.63 KG/M2 | SYSTOLIC BLOOD PRESSURE: 138 MMHG | WEIGHT: 189.2 LBS | HEART RATE: 68 BPM | HEIGHT: 72 IN

## 2025-07-31 DIAGNOSIS — M67.441 GANGLION CYST OF FINGER OF RIGHT HAND: Primary | ICD-10-CM

## 2025-07-31 DIAGNOSIS — F14.10 COCAINE USE DISORDER (HCC): ICD-10-CM

## 2025-07-31 DIAGNOSIS — M67.441 GANGLION CYST OF FINGER OF RIGHT HAND: ICD-10-CM

## 2025-07-31 DIAGNOSIS — F33.1 MDD (MAJOR DEPRESSIVE DISORDER), RECURRENT EPISODE, MODERATE (HCC): ICD-10-CM

## 2025-07-31 PROCEDURE — 99213 OFFICE O/P EST LOW 20 MIN: CPT

## 2025-07-31 PROCEDURE — 99212 OFFICE O/P EST SF 10 MIN: CPT

## 2025-07-31 PROCEDURE — 99214 OFFICE O/P EST MOD 30 MIN: CPT | Performed by: NURSE PRACTITIONER

## 2025-07-31 PROCEDURE — 73120 X-RAY EXAM OF HAND: CPT

## 2025-07-31 RX ORDER — PREDNISONE 5 MG/1
TABLET ORAL
Qty: 30 TABLET | Refills: 0 | Status: SHIPPED | OUTPATIENT
Start: 2025-07-31 | End: 2025-08-10

## 2025-07-31 RX ORDER — LIDOCAINE 40 MG/G
CREAM TOPICAL
Qty: 113 G | Refills: 0 | Status: SHIPPED | OUTPATIENT
Start: 2025-07-31

## 2025-07-31 ASSESSMENT — PAIN DESCRIPTION - FREQUENCY: FREQUENCY: INTERMITTENT

## 2025-07-31 ASSESSMENT — PATIENT HEALTH QUESTIONNAIRE - PHQ9
8. MOVING OR SPEAKING SO SLOWLY THAT OTHER PEOPLE COULD HAVE NOTICED. OR THE OPPOSITE, BEING SO FIGETY OR RESTLESS THAT YOU HAVE BEEN MOVING AROUND A LOT MORE THAN USUAL: NOT AT ALL
1. LITTLE INTEREST OR PLEASURE IN DOING THINGS: NOT AT ALL
SUM OF ALL RESPONSES TO PHQ QUESTIONS 1-9: 0
10. IF YOU CHECKED OFF ANY PROBLEMS, HOW DIFFICULT HAVE THESE PROBLEMS MADE IT FOR YOU TO DO YOUR WORK, TAKE CARE OF THINGS AT HOME, OR GET ALONG WITH OTHER PEOPLE: NOT DIFFICULT AT ALL
SUM OF ALL RESPONSES TO PHQ QUESTIONS 1-9: 0
5. POOR APPETITE OR OVEREATING: NOT AT ALL
3. TROUBLE FALLING OR STAYING ASLEEP: NOT AT ALL
9. THOUGHTS THAT YOU WOULD BE BETTER OFF DEAD, OR OF HURTING YOURSELF: NOT AT ALL
SUM OF ALL RESPONSES TO PHQ QUESTIONS 1-9: 0
SUM OF ALL RESPONSES TO PHQ QUESTIONS 1-9: 0
7. TROUBLE CONCENTRATING ON THINGS, SUCH AS READING THE NEWSPAPER OR WATCHING TELEVISION: NOT AT ALL
2. FEELING DOWN, DEPRESSED OR HOPELESS: NOT AT ALL
4. FEELING TIRED OR HAVING LITTLE ENERGY: NOT AT ALL
6. FEELING BAD ABOUT YOURSELF - OR THAT YOU ARE A FAILURE OR HAVE LET YOURSELF OR YOUR FAMILY DOWN: NOT AT ALL

## 2025-07-31 ASSESSMENT — ENCOUNTER SYMPTOMS
COLOR CHANGE: 0
SHORTNESS OF BREATH: 0
ABDOMINAL DISTENTION: 0
ABDOMINAL PAIN: 0
CHEST TIGHTNESS: 0
BACK PAIN: 0
COUGH: 0
WHEEZING: 0

## 2025-07-31 ASSESSMENT — PAIN DESCRIPTION - ORIENTATION: ORIENTATION: RIGHT

## 2025-07-31 ASSESSMENT — PAIN - FUNCTIONAL ASSESSMENT
PAIN_FUNCTIONAL_ASSESSMENT: ACTIVITIES ARE NOT PREVENTED
PAIN_FUNCTIONAL_ASSESSMENT: 0-10

## 2025-07-31 ASSESSMENT — PAIN SCALES - GENERAL: PAINLEVEL_OUTOF10: 4

## 2025-07-31 ASSESSMENT — PAIN DESCRIPTION - LOCATION: LOCATION: FINGER (COMMENT WHICH ONE)

## 2025-07-31 NOTE — ED TRIAGE NOTES
Issa arrives to room with complaint of lump on right hand 2nd digit for several months with swelling and pain for past 2 days       Not taking medication for pain.

## 2025-07-31 NOTE — ED PROVIDER NOTES
[facility] URGENT CARE  Urgent Care Encounter       CHIEF COMPLAINT       Chief Complaint   Patient presents with    Finger Pain       Nurses Notes reviewed and I agree except as noted in the HPI.  HISTORY OF PRESENT ILLNESS   Joseph M Litten is a 39 y.o. male who presents with concerns of a lump on his right pointer finger for the past several months. Reports this lump is now swelling and painful for the past two days. Patient denies any medication for symptom management.     HPI    REVIEW OF SYSTEMS     Review of Systems   Constitutional:  Negative for fever.   Musculoskeletal:         Lump to right pointer finger   Skin:  Negative for color change.   All other systems reviewed and are negative.      PAST MEDICAL HISTORY         Diagnosis Date    Alcohol abuse     Chronic allergic rhinitis     Drug abuse (HCC)     Major depressive disorder, single episode, severe with anxious distress (HCC) 12/7/2017       SURGICALHISTORY     Patient  has a past surgical history that includes Anterior cruciate ligament repair; Skin graft; Tibia fracture surgery; [place] tooth extraction; Finger surgery (01/10/2015); and knee surgery.    CURRENT MEDICATIONS       Discharge Medication List as of 7/31/2025  1:10 PM          ALLERGIES     Patient is has no known allergies.    Patients   Immunization History   Administered Date(s) Administered    TDaP, ADACEL (age 10y-64y), BOOSTRIX (age 10y+), IM, 0.5mL 01/07/2015, 06/15/2024       FAMILY HISTORY     Patient's family history includes No Known Problems in his brother; Other (age of onset: 63) in his father; Sleep Apnea in his mother.    SOCIAL HISTORY     Patient  reports that he has quit smoking. His smoking use included cigarettes. He started smoking about 18 months ago. He has a 2.4 pack-year smoking history. He has never used smokeless tobacco. He reports that he does not currently use alcohol. He reports that he does not currently use drugs after having used the following

## 2025-07-31 NOTE — PROGRESS NOTES
[facility] PROFESSIONAL SERVS  [facility]  [address] [address]  Dept: [phone]  Dept Fax: [phone]  Loc: [phone]  PROGRESS NOTE      VisitDate: 7/31/2025    Joseph M Litten is a 39 y.o. male who presents today for:     Chief Complaint   Patient presents with    Swelling     Swelling of right pointer finger. Patient has a lump on his finger. First noticed the lump a few months ago but it just began swelling a few days ago.     Follow-up     [facility] ED follow up 7/31/25 Ganglion cyst of finger of right hand         Subjective:  Patient complains of a nodule formation right index finger for the past couple of months.  Reports that his finger has developed pain and swelling over the past couple of days.  Denies any injury.  History of drug use alcohol abuse depression.  Patient reports a previous dog bite with surgical repair to that same finger that is affected.          Review of Systems   Constitutional:  Negative for activity change, appetite change, chills, fatigue and fever.   Eyes:  Negative for visual disturbance.   Respiratory:  Negative for cough, chest tightness, shortness of breath and wheezing.    Cardiovascular:  Negative for chest pain, palpitations and leg swelling.   Gastrointestinal:  Negative for abdominal distention and abdominal pain.   Genitourinary:  Negative for dysuria.   Musculoskeletal:  Negative for arthralgias, back pain and neck pain.   Skin: Negative.  Negative for rash.   Neurological:  Negative for dizziness, light-headedness and headaches.   Hematological:  Negative for adenopathy.   Psychiatric/Behavioral:  Negative for decreased concentration and dysphoric mood.    All other systems reviewed and are negative.    Past Medical History:   Diagnosis Date    Alcohol abuse     Chronic allergic rhinitis     Drug abuse (HCC)     Major depressive disorder, single episode, severe with anxious distress (HCC) 12/7/2017      Past Surgical